# Patient Record
Sex: FEMALE | Race: ASIAN | NOT HISPANIC OR LATINO | Employment: OTHER | URBAN - METROPOLITAN AREA
[De-identification: names, ages, dates, MRNs, and addresses within clinical notes are randomized per-mention and may not be internally consistent; named-entity substitution may affect disease eponyms.]

---

## 2017-04-13 ENCOUNTER — HOSPITAL ENCOUNTER (OUTPATIENT)
Dept: RADIOLOGY | Facility: MEDICAL CENTER | Age: 57
End: 2017-04-13
Attending: OBSTETRICS & GYNECOLOGY
Payer: COMMERCIAL

## 2017-04-13 DIAGNOSIS — Z12.31 VISIT FOR SCREENING MAMMOGRAM: ICD-10-CM

## 2017-04-13 PROCEDURE — G0202 SCR MAMMO BI INCL CAD: HCPCS

## 2019-01-10 ENCOUNTER — HOSPITAL ENCOUNTER (OUTPATIENT)
Dept: RADIOLOGY | Facility: MEDICAL CENTER | Age: 59
End: 2019-01-10
Attending: OBSTETRICS & GYNECOLOGY
Payer: COMMERCIAL

## 2019-01-10 DIAGNOSIS — Z12.31 VISIT FOR SCREENING MAMMOGRAM: ICD-10-CM

## 2019-01-10 PROCEDURE — 77063 BREAST TOMOSYNTHESIS BI: CPT

## 2019-05-23 ENCOUNTER — APPOINTMENT (RX ONLY)
Dept: URBAN - METROPOLITAN AREA CLINIC 35 | Facility: CLINIC | Age: 59
Setting detail: DERMATOLOGY
End: 2019-05-23

## 2019-05-23 DIAGNOSIS — Z71.89 OTHER SPECIFIED COUNSELING: ICD-10-CM

## 2019-05-23 DIAGNOSIS — L81.4 OTHER MELANIN HYPERPIGMENTATION: ICD-10-CM

## 2019-05-23 DIAGNOSIS — L44.8 OTHER SPECIFIED PAPULOSQUAMOUS DISORDERS: ICD-10-CM

## 2019-05-23 DIAGNOSIS — L82.1 OTHER SEBORRHEIC KERATOSIS: ICD-10-CM

## 2019-05-23 DIAGNOSIS — D22 MELANOCYTIC NEVI: ICD-10-CM

## 2019-05-23 PROBLEM — D22.5 MELANOCYTIC NEVI OF TRUNK: Status: ACTIVE | Noted: 2019-05-23

## 2019-05-23 PROCEDURE — 99213 OFFICE O/P EST LOW 20 MIN: CPT

## 2019-05-23 PROCEDURE — ? COUNSELING

## 2019-05-23 ASSESSMENT — LOCATION ZONE DERM
LOCATION ZONE: FACE
LOCATION ZONE: TRUNK

## 2019-05-23 ASSESSMENT — LOCATION SIMPLE DESCRIPTION DERM
LOCATION SIMPLE: RIGHT EYEBROW
LOCATION SIMPLE: RIGHT UPPER BACK

## 2019-05-23 ASSESSMENT — LOCATION DETAILED DESCRIPTION DERM
LOCATION DETAILED: RIGHT INFERIOR UPPER BACK
LOCATION DETAILED: RIGHT CENTRAL EYEBROW
LOCATION DETAILED: RIGHT MID-UPPER BACK
LOCATION DETAILED: RIGHT SUPERIOR UPPER BACK

## 2019-05-23 NOTE — PROCEDURE: COUNSELING
Detail Level: Generalized
Patient Specific Counseling (Will Not Stick From Patient To Patient): Will recheck in 4 weeks, if lesion is not improving, will plan on punch biopsy.
Detail Level: Simple

## 2020-02-01 ENCOUNTER — HOSPITAL ENCOUNTER (OUTPATIENT)
Dept: RADIOLOGY | Facility: MEDICAL CENTER | Age: 60
End: 2020-02-01
Attending: SPECIALIST
Payer: COMMERCIAL

## 2020-02-01 DIAGNOSIS — Z12.31 VISIT FOR SCREENING MAMMOGRAM: ICD-10-CM

## 2020-02-01 PROCEDURE — 77067 SCR MAMMO BI INCL CAD: CPT

## 2021-04-21 ENCOUNTER — IMMUNIZATION (OUTPATIENT)
Dept: FAMILY PLANNING/WOMEN'S HEALTH CLINIC | Facility: IMMUNIZATION CENTER | Age: 61
End: 2021-04-21
Payer: COMMERCIAL

## 2021-04-21 DIAGNOSIS — Z23 ENCOUNTER FOR VACCINATION: Primary | ICD-10-CM

## 2021-04-21 PROCEDURE — 91300 PFIZER SARS-COV-2 VACCINE: CPT

## 2021-04-21 PROCEDURE — 0001A PFIZER SARS-COV-2 VACCINE: CPT

## 2021-05-13 ENCOUNTER — IMMUNIZATION (OUTPATIENT)
Dept: FAMILY PLANNING/WOMEN'S HEALTH CLINIC | Facility: IMMUNIZATION CENTER | Age: 61
End: 2021-05-13
Payer: COMMERCIAL

## 2021-05-13 DIAGNOSIS — Z23 ENCOUNTER FOR VACCINATION: Primary | ICD-10-CM

## 2021-05-13 PROCEDURE — 91300 PFIZER SARS-COV-2 VACCINE: CPT | Performed by: NURSE PRACTITIONER

## 2021-05-13 PROCEDURE — 0002A PFIZER SARS-COV-2 VACCINE: CPT | Performed by: NURSE PRACTITIONER

## 2021-06-15 ENCOUNTER — HOSPITAL ENCOUNTER (OUTPATIENT)
Dept: RADIOLOGY | Facility: MEDICAL CENTER | Age: 61
End: 2021-06-15
Attending: FAMILY MEDICINE
Payer: COMMERCIAL

## 2021-06-15 DIAGNOSIS — Z12.31 VISIT FOR SCREENING MAMMOGRAM: ICD-10-CM

## 2021-06-15 PROCEDURE — 77063 BREAST TOMOSYNTHESIS BI: CPT

## 2021-12-16 ENCOUNTER — GYNECOLOGY VISIT (OUTPATIENT)
Dept: OBGYN | Facility: CLINIC | Age: 61
End: 2021-12-16
Payer: COMMERCIAL

## 2021-12-16 ENCOUNTER — HOSPITAL ENCOUNTER (OUTPATIENT)
Facility: MEDICAL CENTER | Age: 61
End: 2021-12-16
Attending: OBSTETRICS & GYNECOLOGY
Payer: COMMERCIAL

## 2021-12-16 VITALS
DIASTOLIC BLOOD PRESSURE: 78 MMHG | WEIGHT: 126 LBS | SYSTOLIC BLOOD PRESSURE: 122 MMHG | BODY MASS INDEX: 20.99 KG/M2 | HEIGHT: 65 IN

## 2021-12-16 DIAGNOSIS — R30.0 DYSURIA: ICD-10-CM

## 2021-12-16 DIAGNOSIS — R31.9 HEMATURIA OF UNDIAGNOSED CAUSE: ICD-10-CM

## 2021-12-16 LAB
APPEARANCE UR: CLEAR
BILIRUB UR STRIP-MCNC: NORMAL MG/DL
COLOR UR AUTO: YELLOW
GLUCOSE UR STRIP.AUTO-MCNC: NORMAL MG/DL
KETONES UR STRIP.AUTO-MCNC: NORMAL MG/DL
LEUKOCYTE ESTERASE UR QL STRIP.AUTO: NORMAL
NITRITE UR QL STRIP.AUTO: NORMAL
PH UR STRIP.AUTO: 5.5 [PH] (ref 5–8)
PROT UR QL STRIP: NORMAL MG/DL
RBC UR QL AUTO: NORMAL
SP GR UR STRIP.AUTO: 1.03
UROBILINOGEN UR STRIP-MCNC: NORMAL MG/DL

## 2021-12-16 PROCEDURE — 81002 URINALYSIS NONAUTO W/O SCOPE: CPT | Performed by: OBSTETRICS & GYNECOLOGY

## 2021-12-16 PROCEDURE — 99212 OFFICE O/P EST SF 10 MIN: CPT | Performed by: OBSTETRICS & GYNECOLOGY

## 2021-12-16 PROCEDURE — 87186 SC STD MICRODIL/AGAR DIL: CPT

## 2021-12-16 PROCEDURE — 87086 URINE CULTURE/COLONY COUNT: CPT

## 2021-12-16 PROCEDURE — 87077 CULTURE AEROBIC IDENTIFY: CPT

## 2021-12-16 RX ORDER — SULFAMETHOXAZOLE AND TRIMETHOPRIM 800; 160 MG/1; MG/1
1 TABLET ORAL 2 TIMES DAILY
Qty: 6 TABLET | Refills: 0 | Status: SHIPPED | OUTPATIENT
Start: 2021-12-16 | End: 2021-12-19

## 2021-12-16 NOTE — PROGRESS NOTES
Chief Complaint   Patient presents with   • Gynecologic Exam     UTI       History of present illness: 61 y.o.  presents with frequency and dysuria with blood in urine. She states 2 nights ago she had frequency and pressure most of the night and saw flecks of blood in her urine in the toilet. She feels better now but wsa worried about getting sick over holidays.   She is an established patient of mine who had an annual this year at the [facility] office with me.     Review of systems:  Pertinent positives documented in HPI and all other systems reviewed & are negative    Past OB History:   OB History    Para Term  AB Living   1 1 1 0 0 1   SAB IAB Ectopic Molar Multiple Live Births   0 0 0 0 0 0       Past Gynecological History  No LMP recorded.  BC or HRT: none  Postmenopausal: yes Postmenopausal bleeding: no  All PMH, PSH, allergies, social history and FH reviewed and updated today:  Past Medical History:   Diagnosis Date   • Routine health maintenance 2013    BMI Mammogram 10/2012 Colonoscopy -- @ 51 to repeat in 5 years PAP smear  always nrmal Skin lesions to see the dermatologist Immunizations Influenza never Tetanus does not recall           Past Surgical History:   Procedure Laterality Date   • TUBAL COAGULATION LAPAROSCOPIC BILATERAL         Allergies: No Known Allergies    Social History     Socioeconomic History   • Marital status:      Spouse name: Not on file   • Number of children: Not on file   • Years of education: Not on file   • Highest education level: Not on file   Occupational History   • Not on file   Tobacco Use   • Smoking status: Never Smoker   • Smokeless tobacco: Never Used   Vaping Use   • Vaping Use: Never used   Substance and Sexual Activity   • Alcohol use: Yes     Comment: wine with dinner   • Drug use: Never   • Sexual activity: Yes     Partners: Male     Birth control/protection: Female Sterilization     Comment: works with post service   Other Topics  "Concern   • Not on file   Social History Narrative   • Not on file     Social Determinants of Health     Financial Resource Strain:    • Difficulty of Paying Living Expenses: Not on file   Food Insecurity:    • Worried About Running Out of Food in the Last Year: Not on file   • Ran Out of Food in the Last Year: Not on file   Transportation Needs:    • Lack of Transportation (Medical): Not on file   • Lack of Transportation (Non-Medical): Not on file   Physical Activity:    • Days of Exercise per Week: Not on file   • Minutes of Exercise per Session: Not on file   Stress:    • Feeling of Stress : Not on file   Social Connections:    • Frequency of Communication with Friends and Family: Not on file   • Frequency of Social Gatherings with Friends and Family: Not on file   • Attends [employer] Services: Not on file   • Active Member of Clubs or Organizations: Not on file   • Attends Club or Organization Meetings: Not on file   • Marital Status: Not on file   Intimate Partner Violence:    • Fear of Current or Ex-Partner: Not on file   • Emotionally Abused: Not on file   • Physically Abused: Not on file   • Sexually Abused: Not on file   Housing Stability:    • Unable to Pay for Housing in the Last Year: Not on file   • Number of Places Lived in the Last Year: Not on file   • Unstable Housing in the Last Year: Not on file       Family History   Problem Relation Age of Onset   • Diabetes Mother    • Hypertension Mother    • Stroke Mother    • Diabetes Brother    • Hypertension Brother    • Cancer Neg Hx        Physical exam:  /78 (BP Location: Left arm, Patient Position: Sitting, BP Cuff Size: Adult)   Ht 1.651 m (5' 5\")   Wt 57.2 kg (126 lb)     General:appears stated age, is in no apparent distress, is well developed and well nourished  Head: normocephalic    CV : regular rate and rhythm  Psychiatric: Patient shows appropriate affect, is alert and oriented x3, intact judgment and insight.      Assessment  61 y.o. "  here for UTI rule out  1. Dysuria  POCT Urinalysis    URINE CULTURE(NEW)   2. Hematuria of undiagnosed cause         Plan  - UA dip done- sent for culture  - will send in RX for ABX but will only fill if symptoms return or when culture results available if indicated. All ?S answered and she is comfortable with this plan.

## 2021-12-19 LAB
BACTERIA UR CULT: ABNORMAL
BACTERIA UR CULT: ABNORMAL
SIGNIFICANT IND 70042: ABNORMAL
SITE SITE: ABNORMAL
SOURCE SOURCE: ABNORMAL

## 2021-12-21 ENCOUNTER — TELEPHONE (OUTPATIENT)
Dept: OBGYN | Facility: CLINIC | Age: 61
End: 2021-12-21

## 2021-12-21 NOTE — TELEPHONE ENCOUNTER
L/M for pt with positive urine culture results and to start taking bactrim rx that was sent to the pharmacy.[email or web address]

## 2022-06-21 ENCOUNTER — HOSPITAL ENCOUNTER (OUTPATIENT)
Dept: RADIOLOGY | Facility: MEDICAL CENTER | Age: 62
End: 2022-06-21
Attending: OBSTETRICS & GYNECOLOGY
Payer: COMMERCIAL

## 2022-06-21 DIAGNOSIS — Z12.31 VISIT FOR SCREENING MAMMOGRAM: ICD-10-CM

## 2022-06-21 PROCEDURE — 77063 BREAST TOMOSYNTHESIS BI: CPT

## 2023-05-05 ENCOUNTER — HOSPITAL ENCOUNTER (OUTPATIENT)
Dept: LAB | Facility: MEDICAL CENTER | Age: 63
End: 2023-05-05
Attending: NURSE PRACTITIONER
Payer: COMMERCIAL

## 2023-05-05 LAB
ALBUMIN SERPL BCP-MCNC: 4.5 G/DL (ref 3.2–4.9)
ALBUMIN/GLOB SERPL: 1.6 G/DL
ALP SERPL-CCNC: 70 U/L (ref 30–99)
ALT SERPL-CCNC: 17 U/L (ref 2–50)
ANION GAP SERPL CALC-SCNC: 13 MMOL/L (ref 7–16)
AST SERPL-CCNC: 23 U/L (ref 12–45)
BILIRUB SERPL-MCNC: 0.8 MG/DL (ref 0.1–1.5)
BUN SERPL-MCNC: 13 MG/DL (ref 8–22)
CALCIUM ALBUM COR SERPL-MCNC: 9.2 MG/DL (ref 8.5–10.5)
CALCIUM SERPL-MCNC: 9.6 MG/DL (ref 8.5–10.5)
CHLORIDE SERPL-SCNC: 103 MMOL/L (ref 96–112)
CO2 SERPL-SCNC: 24 MMOL/L (ref 20–33)
CREAT SERPL-MCNC: 0.75 MG/DL (ref 0.5–1.4)
GFR SERPLBLD CREATININE-BSD FMLA CKD-EPI: 90 ML/MIN/1.73 M 2
GLOBULIN SER CALC-MCNC: 2.9 G/DL (ref 1.9–3.5)
GLUCOSE SERPL-MCNC: 95 MG/DL (ref 65–99)
POTASSIUM SERPL-SCNC: 3.9 MMOL/L (ref 3.6–5.5)
PROT SERPL-MCNC: 7.4 G/DL (ref 6–8.2)
SODIUM SERPL-SCNC: 140 MMOL/L (ref 135–145)
T4 FREE SERPL-MCNC: 1.16 NG/DL (ref 0.93–1.7)
TSH SERPL DL<=0.005 MIU/L-ACNC: 1.27 UIU/ML (ref 0.38–5.33)

## 2023-05-05 PROCEDURE — 84439 ASSAY OF FREE THYROXINE: CPT

## 2023-05-05 PROCEDURE — 36415 COLL VENOUS BLD VENIPUNCTURE: CPT

## 2023-05-05 PROCEDURE — 80053 COMPREHEN METABOLIC PANEL: CPT

## 2023-05-05 PROCEDURE — 84443 ASSAY THYROID STIM HORMONE: CPT

## 2023-05-11 ENCOUNTER — HOSPITAL ENCOUNTER (OUTPATIENT)
Dept: RADIOLOGY | Facility: MEDICAL CENTER | Age: 63
End: 2023-05-11
Attending: NURSE PRACTITIONER
Payer: COMMERCIAL

## 2023-05-11 DIAGNOSIS — R10.31 RIGHT LOWER QUADRANT ABDOMINAL PAIN: ICD-10-CM

## 2023-05-11 DIAGNOSIS — R19.8: ICD-10-CM

## 2023-05-11 PROCEDURE — 700117 HCHG RX CONTRAST REV CODE 255

## 2023-05-11 PROCEDURE — 74177 CT ABD & PELVIS W/CONTRAST: CPT

## 2023-05-11 RX ADMIN — IOHEXOL 25 ML: 240 INJECTION, SOLUTION INTRATHECAL; INTRAVASCULAR; INTRAVENOUS; ORAL at 15:00

## 2023-05-11 RX ADMIN — IOHEXOL 100 ML: 350 INJECTION, SOLUTION INTRAVENOUS at 15:00

## 2023-07-12 ENCOUNTER — GYNECOLOGY VISIT (OUTPATIENT)
Dept: OBGYN | Facility: CLINIC | Age: 63
End: 2023-07-12
Payer: COMMERCIAL

## 2023-07-12 VITALS — WEIGHT: 128 LBS | BODY MASS INDEX: 21.3 KG/M2

## 2023-07-12 DIAGNOSIS — R19.8 ABDOMINAL FULLNESS IN RIGHT LOWER QUADRANT: Primary | ICD-10-CM

## 2023-07-12 LAB
APPEARANCE UR: CLEAR
BILIRUB UR STRIP-MCNC: NORMAL MG/DL
COLOR UR AUTO: YELLOW
GLUCOSE UR STRIP.AUTO-MCNC: NORMAL MG/DL
KETONES UR STRIP.AUTO-MCNC: NORMAL MG/DL
LEUKOCYTE ESTERASE UR QL STRIP.AUTO: NORMAL
NITRITE UR QL STRIP.AUTO: NORMAL
PH UR STRIP.AUTO: 6 [PH] (ref 5–8)
PROT UR QL STRIP: NORMAL MG/DL
RBC UR QL AUTO: NORMAL
SP GR UR STRIP.AUTO: 1.03
UROBILINOGEN UR STRIP-MCNC: NORMAL MG/DL

## 2023-07-12 PROCEDURE — 99212 OFFICE O/P EST SF 10 MIN: CPT | Performed by: OBSTETRICS & GYNECOLOGY

## 2023-07-12 PROCEDURE — 81002 URINALYSIS NONAUTO W/O SCOPE: CPT | Performed by: OBSTETRICS & GYNECOLOGY

## 2023-07-12 NOTE — PROGRESS NOTES
History of present illness: 63 y.o.  No LMP recorded (approximate). Patient is postmenopausal. presents with complaints of RIGHT sided fullness since the beginning of the year. She was having some problems with constipation then and was seen by GI and had a work up and no pathology discovered. She had a CT abdomen pelvis that was normal. She reports that her bowel movements are back to normal but she still feels just this slight fullness to the right pelvis. It is not painful per say.   She has no vaginal bleeding or discharge. Se is postmenopausal.     Review of systems:  Pertinent positives documented in HPI and all other systems reviewed & are negative    Past OB History:   OB History    Para Term  AB Living   1 1 1 0 0 1   SAB IAB Ectopic Molar Multiple Live Births   0 0 0 0 0 0       Past Gynecological History  No LMP recorded (approximate). Patient is postmenopausal.  Postmenopausal?: yes Postmenopausal bleeding?: no  Pap: last , no hx of abnormal  Symptoms of overactive bladder: no  Symptoms of stress incontinence: no  Symptoms of bowel incontinence: no  Feeling of vaginal bulge: no      All PMH, PSH, allergies, social history and FH reviewed and updated today:  Past Medical History:   Diagnosis Date    Routine health maintenance 2013    BMI Mammogram 10/2012 Colonoscopy -- @ 51 to repeat in 5 years PAP smear  always nrmal Skin lesions to see the dermatologist Immunizations Influenza never Tetanus does not recall           Past Surgical History:   Procedure Laterality Date    TUBAL COAGULATION LAPAROSCOPIC BILATERAL         Allergies: No Known Allergies    Social History     Socioeconomic History    Marital status:      Spouse name: Not on file    Number of children: Not on file    Years of education: Not on file    Highest education level: Not on file   Occupational History    Not on file   Tobacco Use    Smoking status: Never    Smokeless tobacco: Never   Vaping  Use    Vaping Use: Never used   Substance and Sexual Activity    Alcohol use: Yes     Comment: wine with dinner    Drug use: Never    Sexual activity: Yes     Partners: Male     Birth control/protection: Female Sterilization     Comment: works with post service   Other Topics Concern    Not on file   Social History Narrative    Not on file     Social Determinants of Health     Financial Resource Strain: Not on file   Food Insecurity: Not on file   Transportation Needs: Not on file   Physical Activity: Not on file   Stress: Not on file   Social Connections: Not on file   Intimate Partner Violence: Not on file   Housing Stability: Not on file       Family History   Problem Relation Age of Onset    Diabetes Mother     Hypertension Mother     Stroke Mother     Diabetes Brother     Hypertension Brother     Cancer Neg Hx        Physical exam:  Wt 128 lb     General:is in no apparent distress  Head: normocephalic, non-tender  Abdomen: Bowel sounds positive, nondistended, soft, nontender x4, no rebound or guarding. No organomegaly. No masses. Reports fullness in RLQ.   Female GYN: normal female external genitalia without lesions, no vaginal discharge, vulva pink without erythema or friability. Cervix parous without lesions. Right adnexal fullness with TTP. Left adnexa NT, no masses palapted  Psychiatric: Patient shows appropriate affect, is alert and oriented x3, intact judgment and insight.      Assessment  63 y.o.  here with c/o  1. Abdominal fullness in right lower quadrant            Plan  - Pelvic US ordered secondary to exam findings of fullness and TTP right adnexa  - Follow up when results available

## 2023-07-12 NOTE — PROGRESS NOTES
Pt here as New Pt for Gyn exam  Confirmed ph#, drug allergies, medications, and pharmacy on file.  Last Pap:2021  Abnormal Pap:No Hx of abnormal pap smear per pt  Last Mammo:6/21/2022  BCM:none  LMP:postmenopausal  Pt states would like to establish care.  Pt states would like to have a well woman exam.

## 2023-08-09 ENCOUNTER — HOSPITAL ENCOUNTER (OUTPATIENT)
Dept: RADIOLOGY | Facility: MEDICAL CENTER | Age: 63
End: 2023-08-09
Attending: OBSTETRICS & GYNECOLOGY
Payer: COMMERCIAL

## 2023-08-09 DIAGNOSIS — R19.8 ABDOMINAL FULLNESS IN RIGHT LOWER QUADRANT: ICD-10-CM

## 2023-08-09 PROCEDURE — 76830 TRANSVAGINAL US NON-OB: CPT

## 2023-09-27 ENCOUNTER — GYNECOLOGY VISIT (OUTPATIENT)
Dept: OBGYN | Facility: CLINIC | Age: 63
End: 2023-09-27
Payer: COMMERCIAL

## 2023-09-27 ENCOUNTER — HOSPITAL ENCOUNTER (OUTPATIENT)
Facility: MEDICAL CENTER | Age: 63
End: 2023-09-27
Attending: OBSTETRICS & GYNECOLOGY
Payer: COMMERCIAL

## 2023-09-27 DIAGNOSIS — R93.5 ABNORMAL ENDOMETRIAL ULTRASOUND: ICD-10-CM

## 2023-09-27 LAB — PATHOLOGY CONSULT NOTE: NORMAL

## 2023-09-27 PROCEDURE — 88305 TISSUE EXAM BY PATHOLOGIST: CPT

## 2023-09-27 PROCEDURE — 58100 BIOPSY OF UTERUS LINING: CPT | Performed by: OBSTETRICS & GYNECOLOGY

## 2023-09-27 NOTE — PROGRESS NOTES
GYN Visit:    CC: endometrial biopsy    HPI:  63 y.o.  here today for planned endometrial biopsy. She is postmenopausal and has not had any vaginal bleeding. She was seen earlier this year for right pelvic fullness. She was seen by PCP and GI. She had CT scan that was benign. Her pelvic US also revealed no adnexal findings and uterus was normal with ES 0.49 cm but with a focal hypoechoic debris and internal echoes in the fundal endometrial canal of unclear etiology. We discussed that the suspicion for abnormality is low but because we have identified this that evaluation with biopsy is recommended. She agrees and present for this today.     No concerns today     Past Medical History:   Diagnosis Date    Routine health maintenance 2013    BMI Mammogram 10/2012 Colonoscopy -- @ 51 to repeat in 5 years PAP smear  always nrmal Skin lesions to see the dermatologist Immunizations Influenza never Tetanus does not recall           ROS:  Gen: denies fevers, general concerns  : denies concerns    There were no vitals taken for this visit.  Gen: AAO, NAD  : NEFG, normal vagina and cervix    EMB:  Cervix cleansed with betadine x3.  Ant lip grasped with tenaculum.  Pipelle passed easily through cervix, uterus sounded to 6.5 cm.  3 passes were made with adequate collection of tissue.  With the initial pass and removal in the fundus there was clear thick mucous that was drawn into the pipelle. Specimen labeled and sent to pathology. All instruments removed with hemostasis noted.  Pt tolerated well.    A/P: 63 y.o.  s/p EMB for   1. Abnormal endometrial ultrasound  Consent for all Surgical, Special Diagnostic or Therapeutic Procedures    CANCELED: Consent for all Surgical, Special Diagnostic or Therapeutic Procedures    CANCELED: Consent for all Surgical, Special Diagnostic or Therapeutic Procedures         - will notify pt of pathology

## 2024-04-08 ENCOUNTER — GYNECOLOGY VISIT (OUTPATIENT)
Dept: OBGYN | Facility: CLINIC | Age: 64
End: 2024-04-08
Payer: COMMERCIAL

## 2024-04-08 ENCOUNTER — HOSPITAL ENCOUNTER (OUTPATIENT)
Facility: MEDICAL CENTER | Age: 64
End: 2024-04-08
Attending: OBSTETRICS & GYNECOLOGY
Payer: COMMERCIAL

## 2024-04-08 VITALS — WEIGHT: 125.4 LBS | BODY MASS INDEX: 20.87 KG/M2 | DIASTOLIC BLOOD PRESSURE: 66 MMHG | SYSTOLIC BLOOD PRESSURE: 127 MMHG

## 2024-04-08 DIAGNOSIS — N30.01 ACUTE CYSTITIS WITH HEMATURIA: ICD-10-CM

## 2024-04-08 DIAGNOSIS — N30.01 ACUTE CYSTITIS WITH HEMATURIA: Primary | ICD-10-CM

## 2024-04-08 LAB
APPEARANCE UR: NORMAL
BILIRUB UR STRIP-MCNC: NEGATIVE MG/DL
COLOR UR AUTO: YELLOW
GLUCOSE UR STRIP.AUTO-MCNC: NEGATIVE MG/DL
KETONES UR STRIP.AUTO-MCNC: NORMAL MG/DL
LEUKOCYTE ESTERASE UR QL STRIP.AUTO: NEGATIVE
NITRITE UR QL STRIP.AUTO: NEGATIVE
PH UR STRIP.AUTO: 6.5 [PH] (ref 5–8)
PROT UR QL STRIP: NEGATIVE MG/DL
RBC UR QL AUTO: NEGATIVE
SP GR UR STRIP.AUTO: 1.02
UROBILINOGEN UR STRIP-MCNC: 0.2 MG/DL

## 2024-04-08 PROCEDURE — 87186 SC STD MICRODIL/AGAR DIL: CPT

## 2024-04-08 PROCEDURE — 81002 URINALYSIS NONAUTO W/O SCOPE: CPT | Performed by: OBSTETRICS & GYNECOLOGY

## 2024-04-08 PROCEDURE — 87086 URINE CULTURE/COLONY COUNT: CPT

## 2024-04-08 PROCEDURE — 3078F DIAST BP <80 MM HG: CPT | Performed by: OBSTETRICS & GYNECOLOGY

## 2024-04-08 PROCEDURE — 87077 CULTURE AEROBIC IDENTIFY: CPT

## 2024-04-08 PROCEDURE — 3074F SYST BP LT 130 MM HG: CPT | Performed by: OBSTETRICS & GYNECOLOGY

## 2024-04-08 PROCEDURE — 99213 OFFICE O/P EST LOW 20 MIN: CPT | Performed by: OBSTETRICS & GYNECOLOGY

## 2024-04-08 RX ORDER — SULFAMETHOXAZOLE AND TRIMETHOPRIM 800; 160 MG/1; MG/1
1 TABLET ORAL 2 TIMES DAILY
Qty: 6 TABLET | Refills: 0 | Status: SHIPPED | OUTPATIENT
Start: 2024-04-08 | End: 2024-04-11

## 2024-04-08 RX ORDER — CYCLOSPORINE 0.5 MG/ML
1 EMULSION OPHTHALMIC 2 TIMES DAILY
COMMUNITY

## 2024-04-08 NOTE — PROGRESS NOTES
GYN PROBLEM VISIT    CC:  UTI       HPI: Patient is a 63 y.o.  who complains of possible UTI. She states it started Saturday with a lower abdominal cramp and then she noticed red blood in the toilet/urine when she peed. She doesn't think it was vaginal but states it was definitely initially bloody. Throughout the day it faded to pink but she experienced continued urge to urinate throughout the day which is mildly improved.         ROS:   General: denies fever / chills  HEENT: denies sore throat:  CV: denies chest pain:  Repiratory: denies shortness of breath  GI: denies abdominal pain  : denies dysuria:    PFSH:  I personally reviewed the past medical and surgical histories.     Social History     Tobacco Use    Smoking status: Never    Smokeless tobacco: Never   Vaping Use    Vaping Use: Never used   Substance Use Topics    Alcohol use: Yes     Alcohol/week: 0.6 oz     Types: 1 Glasses of wine per week     Comment: wine with dinner    Drug use: Never       Social History     Substance and Sexual Activity   Sexual Activity Yes    Partners: Male    Birth control/protection: Female Sterilization    Comment: works with post service        ALLERGIES / REACTIONS:  No Known Allergies                        PHYSICAL EXAMINATION:  Vital Signs:   Vitals:    24 1120   BP: 127/66   BP Location: Right arm   Patient Position: Sitting   BP Cuff Size: Adult   Weight: 125 lb 6.4 oz     Body mass index is 20.87 kg/m².    Gen: appears well, NAD  Respiratory: normal effort  Abdomen: Soft,  Mild suprapubic tenderness  Pelvic Exam:    Vulva: normal.    Urethra: normal.   Vagina:  moderate atrophy noted. No blood or old blood in vault.    Cervix: normal.     ASSESSMENT AND PLAN:  63 y.o.    1. Acute cystitis with hematuria   - UA shows +nit and moderate blood consistent with her description and consistent with UTI   - will send for C&S   - Rx bactrim   - no evidence on exam that this was vaginal bleeding but  counseled to return if she continues to see blood. In this case it was likely hematuria given findings on UA today    - sulfamethoxazole-trimethoprim (BACTRIM DS) 800-160 MG tablet; Take 1 Tablet by mouth 2 times a day for 3 days.  Dispense: 6 Tablet; Refill: 0  - URINE CULTURE(NEW); Future      Bere Nava D.O.

## 2024-04-08 NOTE — PROGRESS NOTES
Glad she is feeling better.  Continue to hold valsartan at this time.  Continue to monitor blood pressures.    Can evaluate for possible restart of a reduced dose of valsartan in the future   Patient here for annual exam  Last pap done/result: maybe a year ago  LMP: post menopause   BCM:  Last mammogram, if applicable:  Phone number: [phone]   Pharmacy verified

## 2024-04-09 LAB
AMBIGUOUS DTTM AMBI4: NORMAL
SIGNIFICANT IND 70042: NORMAL
SITE SITE: NORMAL
SOURCE SOURCE: NORMAL

## 2024-04-27 ENCOUNTER — OFFICE VISIT (OUTPATIENT)
Dept: URGENT CARE | Facility: PHYSICIAN GROUP | Age: 64
End: 2024-04-27
Payer: COMMERCIAL

## 2024-04-27 VITALS
DIASTOLIC BLOOD PRESSURE: 72 MMHG | TEMPERATURE: 98.8 F | WEIGHT: 121.36 LBS | HEART RATE: 60 BPM | OXYGEN SATURATION: 99 % | SYSTOLIC BLOOD PRESSURE: 100 MMHG | HEIGHT: 64 IN | RESPIRATION RATE: 16 BRPM | BODY MASS INDEX: 20.72 KG/M2

## 2024-04-27 DIAGNOSIS — R53.83 OTHER FATIGUE: ICD-10-CM

## 2024-04-27 DIAGNOSIS — R50.9 FEVER AND CHILLS: ICD-10-CM

## 2024-04-27 DIAGNOSIS — M79.10 MYALGIA: ICD-10-CM

## 2024-04-27 DIAGNOSIS — R30.0 DYSURIA: ICD-10-CM

## 2024-04-27 DIAGNOSIS — B34.9 NONSPECIFIC SYNDROME SUGGESTIVE OF VIRAL ILLNESS: ICD-10-CM

## 2024-04-27 DIAGNOSIS — G44.89 OTHER HEADACHE SYNDROME: ICD-10-CM

## 2024-04-27 LAB
APPEARANCE UR: CLEAR
BILIRUB UR STRIP-MCNC: NEGATIVE MG/DL
COLOR UR AUTO: YELLOW
FLUAV RNA SPEC QL NAA+PROBE: NEGATIVE
FLUBV RNA SPEC QL NAA+PROBE: NEGATIVE
GLUCOSE UR STRIP.AUTO-MCNC: NEGATIVE MG/DL
KETONES UR STRIP.AUTO-MCNC: NORMAL MG/DL
LEUKOCYTE ESTERASE UR QL STRIP.AUTO: NEGATIVE
NITRITE UR QL STRIP.AUTO: NEGATIVE
PH UR STRIP.AUTO: 5.5 [PH] (ref 5–8)
PROT UR QL STRIP: NEGATIVE MG/DL
RBC UR QL AUTO: NORMAL
RSV RNA SPEC QL NAA+PROBE: NEGATIVE
SARS-COV-2 RNA RESP QL NAA+PROBE: NEGATIVE
SP GR UR STRIP.AUTO: 1.02
UROBILINOGEN UR STRIP-MCNC: NORMAL MG/DL

## 2024-04-27 PROCEDURE — 3074F SYST BP LT 130 MM HG: CPT | Performed by: NURSE PRACTITIONER

## 2024-04-27 PROCEDURE — 99214 OFFICE O/P EST MOD 30 MIN: CPT | Mod: 25 | Performed by: NURSE PRACTITIONER

## 2024-04-27 PROCEDURE — 81002 URINALYSIS NONAUTO W/O SCOPE: CPT | Performed by: NURSE PRACTITIONER

## 2024-04-27 PROCEDURE — 0241U POCT CEPHEID COV-2, FLU A/B, RSV - PCR: CPT | Performed by: NURSE PRACTITIONER

## 2024-04-27 PROCEDURE — 3078F DIAST BP <80 MM HG: CPT | Performed by: NURSE PRACTITIONER

## 2024-04-27 RX ORDER — KETOROLAC TROMETHAMINE 30 MG/ML
15 INJECTION, SOLUTION INTRAMUSCULAR; INTRAVENOUS ONCE
Status: COMPLETED | OUTPATIENT
Start: 2024-04-27 | End: 2024-04-27

## 2024-04-27 RX ADMIN — KETOROLAC TROMETHAMINE 15 MG: 30 INJECTION, SOLUTION INTRAMUSCULAR; INTRAVENOUS at 15:15

## 2024-04-27 NOTE — PROGRESS NOTES
"Subjective:   Rivka Kumar is a 63 y.o. female who presents for Fever (Fever, lethargic, loss of weight, loss of appetite, difficult urinating, headache, chills   X 3 days did have a UTI on 04/08/24, did get the shingles vaccination a week ago as well )       HPI  Patient is a pleasant 63 y.o. who presents for evaluation of 3-day history of fever with a Tmax of 103, fatigue, decreased appetite, severe headache, and chills.  Patient reports recently having UTI, which she took medication as prescribed.  Patient also reports updating her vaccination schedule.  Denies any known ill contacts or exposures.  Has taken ibuprofen intermittently with partial relief of symptoms, however her headache is continued.  Denies any vision change or dizziness.    ROS  All other systems are negative except as documented above within HPI.    MEDS:   Current Outpatient Medications:     cycloSPORINE (RESTASIS) 0.05 % ophthalmic emulsion, Administer 1 Drop into both eyes 2 times a day., Disp: , Rfl:     cyclobenzaprine (FLEXERIL) 5 MG tablet, Take 1 Tab by mouth 2 times a day as needed for Muscle Spasms., Disp: 40 Tab, Rfl: 0  ALLERGIES: No Known Allergies    Patient's PMH, SocHx, SurgHx, FamHx, Drug allergies and medications were reviewed.     Objective:   /72 (BP Location: Left arm, Patient Position: Sitting, BP Cuff Size: Adult)   Pulse 60   Temp 37.1 °C (98.8 °F) (Temporal)   Resp 16   Ht 1.626 m (5' 4\")   Wt 55 kg (121 lb 5.8 oz)   SpO2 99%   BMI 20.83 kg/m²     Physical Exam  Vitals and nursing note reviewed.   Constitutional:       General: She is awake.      Appearance: Normal appearance. She is well-developed and normal weight.      Comments: Appears tired   HENT:      Head: Normocephalic and atraumatic.      Right Ear: Tympanic membrane, ear canal and external ear normal.      Left Ear: Tympanic membrane, ear canal and external ear normal.      Nose: Nose normal.      Mouth/Throat:      Lips: Pink.      " Mouth: Mucous membranes are moist.      Pharynx: Oropharynx is clear. Uvula midline.   Eyes:      Extraocular Movements: Extraocular movements intact.      Conjunctiva/sclera: Conjunctivae normal.      Pupils: Pupils are equal, round, and reactive to light.   Neck:      Thyroid: No thyromegaly.      Trachea: Trachea normal.   Cardiovascular:      Rate and Rhythm: Normal rate and regular rhythm.      Pulses: Normal pulses.      Heart sounds: Normal heart sounds, S1 normal and S2 normal.   Pulmonary:      Effort: Pulmonary effort is normal. No respiratory distress.      Breath sounds: Normal breath sounds. No wheezing, rhonchi or rales.   Abdominal:      General: Bowel sounds are normal.      Palpations: Abdomen is soft.   Musculoskeletal:         General: Normal range of motion.      Cervical back: Full passive range of motion without pain, normal range of motion and neck supple.   Lymphadenopathy:      Cervical: No cervical adenopathy.   Skin:     General: Skin is warm and dry.      Capillary Refill: Capillary refill takes less than 2 seconds.   Neurological:      General: No focal deficit present.      Mental Status: She is alert and oriented to person, place, and time.      Gait: Gait is intact.   Psychiatric:         Attention and Perception: Attention and perception normal.         Mood and Affect: Mood normal.         Speech: Speech normal.         Behavior: Behavior normal. Behavior is cooperative.         Thought Content: Thought content normal.         Judgment: Judgment normal.         Assessment/Plan:   Assessment    There are no diagnoses linked to this encounter.    Vital signs stable at today's acute urgent care visit.  Begin medications as listed. Recommend ***    Advised the patient to follow-up with the primary care provider if symptoms persist.  Red flags discussed and indications to immediately call 911 or present to the ED. All questions were encouraged and answered to the patient's satisfaction  and understanding, and they agree to the plan of care.     This is an acute problem with uncertain prognosis, medication management and instructions as well as management options were provided.  I personally reviewed prior external notes and test results pertinent to today and independently reviewed and interpreted all diagnostics, to include POCT testing. Time spent evaluating this patient includes preparing for visit, counseling/education, exam, evaluation, obtaining history, and ordering lab/test/procedures.      Please note that this dictation was created using voice recognition software. I have made a reasonable attempt to correct obvious errors, but I expect that there are errors of grammar and possibly content that I did not discover before finalizing the note.

## 2024-06-25 SDOH — ECONOMIC STABILITY: HOUSING INSECURITY
IN THE LAST 12 MONTHS, WAS THERE A TIME WHEN YOU DID NOT HAVE A STEADY PLACE TO SLEEP OR SLEPT IN A SHELTER (INCLUDING NOW)?: NO

## 2024-06-25 SDOH — ECONOMIC STABILITY: HOUSING INSECURITY: IN THE LAST 12 MONTHS, HOW MANY PLACES HAVE YOU LIVED?: 1

## 2024-06-25 SDOH — ECONOMIC STABILITY: TRANSPORTATION INSECURITY
IN THE PAST 12 MONTHS, HAS THE LACK OF TRANSPORTATION KEPT YOU FROM MEDICAL APPOINTMENTS OR FROM GETTING MEDICATIONS?: NO

## 2024-06-25 SDOH — HEALTH STABILITY: PHYSICAL HEALTH: ON AVERAGE, HOW MANY MINUTES DO YOU ENGAGE IN EXERCISE AT THIS LEVEL?: 60 MIN

## 2024-06-25 SDOH — ECONOMIC STABILITY: INCOME INSECURITY: IN THE LAST 12 MONTHS, WAS THERE A TIME WHEN YOU WERE NOT ABLE TO PAY THE MORTGAGE OR RENT ON TIME?: NO

## 2024-06-25 SDOH — HEALTH STABILITY: MENTAL HEALTH
STRESS IS WHEN SOMEONE FEELS TENSE, NERVOUS, ANXIOUS, OR CAN'T SLEEP AT NIGHT BECAUSE THEIR MIND IS TROUBLED. HOW STRESSED ARE YOU?: TO SOME EXTENT

## 2024-06-25 SDOH — HEALTH STABILITY: PHYSICAL HEALTH: ON AVERAGE, HOW MANY DAYS PER WEEK DO YOU ENGAGE IN MODERATE TO STRENUOUS EXERCISE (LIKE A BRISK WALK)?: 4 DAYS

## 2024-06-25 SDOH — ECONOMIC STABILITY: TRANSPORTATION INSECURITY
IN THE PAST 12 MONTHS, HAS LACK OF RELIABLE TRANSPORTATION KEPT YOU FROM MEDICAL APPOINTMENTS, MEETINGS, WORK OR FROM GETTING THINGS NEEDED FOR DAILY LIVING?: NO

## 2024-06-25 SDOH — ECONOMIC STABILITY: FOOD INSECURITY: WITHIN THE PAST 12 MONTHS, YOU WORRIED THAT YOUR FOOD WOULD RUN OUT BEFORE YOU GOT MONEY TO BUY MORE.: NEVER TRUE

## 2024-06-25 SDOH — ECONOMIC STABILITY: FOOD INSECURITY: WITHIN THE PAST 12 MONTHS, THE FOOD YOU BOUGHT JUST DIDN'T LAST AND YOU DIDN'T HAVE MONEY TO GET MORE.: NEVER TRUE

## 2024-06-25 SDOH — ECONOMIC STABILITY: INCOME INSECURITY: HOW HARD IS IT FOR YOU TO PAY FOR THE VERY BASICS LIKE FOOD, HOUSING, MEDICAL CARE, AND HEATING?: NOT HARD AT ALL

## 2024-06-25 SDOH — ECONOMIC STABILITY: TRANSPORTATION INSECURITY
IN THE PAST 12 MONTHS, HAS LACK OF TRANSPORTATION KEPT YOU FROM MEETINGS, WORK, OR FROM GETTING THINGS NEEDED FOR DAILY LIVING?: NO

## 2024-06-25 ASSESSMENT — SOCIAL DETERMINANTS OF HEALTH (SDOH)
IN A TYPICAL WEEK, HOW MANY TIMES DO YOU TALK ON THE PHONE WITH FAMILY, FRIENDS, OR NEIGHBORS?: MORE THAN THREE TIMES A WEEK
IN THE PAST 12 MONTHS, HAS THE ELECTRIC, GAS, OIL, OR WATER COMPANY THREATENED TO SHUT OFF SERVICE IN YOUR HOME?: NO
HOW OFTEN DO YOU HAVE A DRINK CONTAINING ALCOHOL: 4 OR MORE TIMES A WEEK
IN A TYPICAL WEEK, HOW MANY TIMES DO YOU TALK ON THE PHONE WITH FAMILY, FRIENDS, OR NEIGHBORS?: MORE THAN THREE TIMES A WEEK
HOW OFTEN DO YOU ATTEND CHURCH OR RELIGIOUS SERVICES?: NEVER
WITHIN THE PAST 12 MONTHS, YOU WORRIED THAT YOUR FOOD WOULD RUN OUT BEFORE YOU GOT THE MONEY TO BUY MORE: NEVER TRUE
HOW OFTEN DO YOU HAVE SIX OR MORE DRINKS ON ONE OCCASION: NEVER
HOW HARD IS IT FOR YOU TO PAY FOR THE VERY BASICS LIKE FOOD, HOUSING, MEDICAL CARE, AND HEATING?: NOT HARD AT ALL
HOW MANY DRINKS CONTAINING ALCOHOL DO YOU HAVE ON A TYPICAL DAY WHEN YOU ARE DRINKING: 1 OR 2
DO YOU BELONG TO ANY CLUBS OR ORGANIZATIONS SUCH AS CHURCH GROUPS UNIONS, FRATERNAL OR ATHLETIC GROUPS, OR SCHOOL GROUPS?: YES
HOW OFTEN DO YOU ATTENT MEETINGS OF THE CLUB OR ORGANIZATION YOU BELONG TO?: MORE THAN 4 TIMES PER YEAR
DO YOU BELONG TO ANY CLUBS OR ORGANIZATIONS SUCH AS CHURCH GROUPS UNIONS, FRATERNAL OR ATHLETIC GROUPS, OR SCHOOL GROUPS?: YES
HOW OFTEN DO YOU ATTEND CHURCH OR RELIGIOUS SERVICES?: NEVER
HOW OFTEN DO YOU GET TOGETHER WITH FRIENDS OR RELATIVES?: MORE THAN THREE TIMES A WEEK
HOW OFTEN DO YOU ATTENT MEETINGS OF THE CLUB OR ORGANIZATION YOU BELONG TO?: MORE THAN 4 TIMES PER YEAR
HOW OFTEN DO YOU GET TOGETHER WITH FRIENDS OR RELATIVES?: MORE THAN THREE TIMES A WEEK

## 2024-06-25 ASSESSMENT — LIFESTYLE VARIABLES
HOW OFTEN DO YOU HAVE A DRINK CONTAINING ALCOHOL: 4 OR MORE TIMES A WEEK
HOW MANY STANDARD DRINKS CONTAINING ALCOHOL DO YOU HAVE ON A TYPICAL DAY: 1 OR 2
SKIP TO QUESTIONS 9-10: 1
HOW OFTEN DO YOU HAVE SIX OR MORE DRINKS ON ONE OCCASION: NEVER
AUDIT-C TOTAL SCORE: 4

## 2024-06-28 ENCOUNTER — OFFICE VISIT (OUTPATIENT)
Dept: MEDICAL GROUP | Facility: PHYSICIAN GROUP | Age: 64
End: 2024-06-28
Payer: COMMERCIAL

## 2024-06-28 VITALS
OXYGEN SATURATION: 98 % | SYSTOLIC BLOOD PRESSURE: 120 MMHG | TEMPERATURE: 98 F | HEIGHT: 64 IN | WEIGHT: 126 LBS | DIASTOLIC BLOOD PRESSURE: 70 MMHG | HEART RATE: 50 BPM | BODY MASS INDEX: 21.51 KG/M2

## 2024-06-28 DIAGNOSIS — Z00.00 WELLNESS EXAMINATION: ICD-10-CM

## 2024-06-28 DIAGNOSIS — Z11.59 NEED FOR HEPATITIS C SCREENING TEST: ICD-10-CM

## 2024-06-28 DIAGNOSIS — Z12.31 ENCOUNTER FOR SCREENING MAMMOGRAM FOR MALIGNANT NEOPLASM OF BREAST: ICD-10-CM

## 2024-06-28 DIAGNOSIS — Z11.3 SCREENING EXAMINATION FOR SEXUALLY TRANSMITTED DISEASE: ICD-10-CM

## 2024-06-28 ASSESSMENT — PATIENT HEALTH QUESTIONNAIRE - PHQ9: CLINICAL INTERPRETATION OF PHQ2 SCORE: 0

## 2024-06-28 NOTE — PROGRESS NOTES
Subjective:     CC:   Chief Complaint   Patient presents with    [facility] Care       HPI:   Rivka Kumar is a 64 y.o. female who presents for annual exam. She is feeling well and denies any complaints.    Ob-Gyn/ History:    Patient has GYN provider: Yes  /Para:    Last Pap Smear:  2023. No history of abnormal pap smears.  Gyn Surgery:  Tubal ligation, endometrial bioposy  Currently sexually active.  Last menstrual period: 10 years ago  No significant bloating/fluid retention, pelvic pain, or dyspareunia. No vaginal discharge  Post-menopausal bleeding: None  Urinary incontinence: None  Folate intake:     Health Maintenance  Advanced directive: N/A  Osteoporosis Screen/ DEXA: N/A  PT for falls prevention: N/A  Cholesterol Screening: Lipid panel order provided  Diabetes Screening: Fasting CMP order provided  Aspirin Use: N/A    Anticipatory Guidance  Diet: She reports that she a good diet. She describes it as well balanced.  Exercise: She exercises about 4 days a week. She does Crossfit and hikes  Substance Abuse: N/A  Safe in relationship.   Seat belts, bike helmet, gun safety discussed.  Sun protection used.  Dental Home.    Cancer screening  Colorectal Cancer Screening: Patient had a colonoscopy three years ago. Records requested.  Lung Cancer Screening: N/A  Cervical Cancer Screening: Patient had endometrial biopsy completed in 2023 with benign findings. Patient to schedule follow-up appointment with gynecology  Breast Cancer Screening: Order for mammogram provided today    Infectious disease screening/Immunizations  --STI Screening: Declined  --Practices safe sex.  --HIV Screening: Ordered today  --Hepatitis C Screening: Ordered today  --Immunizations:    Influenza: Recommended to receive in the fall   HPV:  N/A   Tetanus: Due next in 2030   Shingles: Patient has received the first dose, states she needs to receive the second dose   Pneumococcal: N/A   Hepatitis B: N/A  COVID-19:  Completed  Other immunizations: N/A    She  has a past medical history of Routine health maintenance (6/20/2013).  She  has a past surgical history that includes tubal coagulation laparoscopic bilateral.    Family History   Problem Relation Age of Onset    Diabetes Mother     Hypertension Mother     Stroke Mother     Diabetes Brother     Hypertension Brother     Alcohol abuse Brother     Cancer Neg Hx     Breast Cancer Neg Hx     Colorectal Cancer Neg Hx        Social History     Socioeconomic History    Marital status:      Spouse name: Not on file    Number of children: Not on file    Years of education: Not on file    Highest education level: Bachelor's degree (e.g., BA, AB, BS)   Occupational History    Not on file   Tobacco Use    Smoking status: Never    Smokeless tobacco: Never   Vaping Use    Vaping status: Never Used   Substance and Sexual Activity    Alcohol use: Yes     Alcohol/week: 0.6 oz     Types: 1 Glasses of wine per week     Comment: wine with dinner    Drug use: Never    Sexual activity: Yes     Partners: Male     Birth control/protection: Female Sterilization     Comment: works with post service   Other Topics Concern    Not on file   Social History Narrative    Not on file     Social Determinants of Health     Financial Resource Strain: Low Risk  (6/25/2024)    Overall Financial Resource Strain (CARDIA)     Difficulty of Paying Living Expenses: Not hard at all   Food Insecurity: No Food Insecurity (6/25/2024)    Hunger Vital Sign     Worried About Running Out of Food in the Last Year: Never true     Ran Out of Food in the Last Year: Never true   Transportation Needs: No Transportation Needs (6/25/2024)    PRAPARE - Transportation     Lack of Transportation (Medical): No     Lack of Transportation (Non-Medical): No   Physical Activity: Sufficiently Active (6/25/2024)    Exercise Vital Sign     Days of Exercise per Week: 4 days     Minutes of Exercise per Session: 60 min   Stress: Stress  Concern Present (6/25/2024)    [employer] - Occupational Stress Questionnaire     Feeling of Stress : To some extent   Social Connections: Moderately Integrated (6/25/2024)    Social Connection and Isolation Panel [NHANES]     Frequency of Communication with Friends and Family: More than three times a week     Frequency of Social Gatherings with Friends and Family: More than three times a week     Attends Christian Services: Never     Active Member of Clubs or Organizations: Yes     Attends Club or Organization Meetings: More than 4 times per year     Marital Status:    Intimate Partner Violence: Not on file   Housing Stability: Low Risk  (6/25/2024)    Housing Stability Vital Sign     Unable to Pay for Housing in the Last Year: No     Number of Places Lived in the Last Year: 1     Unstable Housing in the Last Year: No       There are no problems to display for this patient.        Current Outpatient Medications   Medication Sig Dispense Refill    cycloSPORINE (RESTASIS) 0.05 % ophthalmic emulsion Administer 1 Drop into both eyes 2 times a day.       No current facility-administered medications for this visit.     No Known Allergies    Review of Systems   Constitutional: Negative for fever, chills and malaise/fatigue.   HENT: Negative for congestion.    Eyes: Negative for pain.    Respiratory: Negative for cough and shortness of breath.  Cardiovascular: Negative for leg swelling.   Gastrointestinal: Negative for nausea, vomiting, abdominal pain and diarrhea.   Genitourinary: Negative for dysuria and hematuria.   Skin: Negative for rash.   Neurological: Negative for dizziness, focal weakness and headaches.   Endo/Heme/Allergies: Does not bleed easily.   Psychiatric/Behavioral: Negative for depression. The patient is not nervous/anxious.      Objective:     /70 (BP Location: Left arm, Patient Position: Sitting, BP Cuff Size: Adult)   Pulse (!) 50   Temp 36.7 °C (98 °F)  "(Temporal)   Ht 1.626 m (5' 4\")   Wt 57.2 kg (126 lb)   SpO2 98%   BMI 21.63 kg/m²   Body mass index is 21.63 kg/m².  Wt Readings from Last 4 Encounters:   06/28/24 57.2 kg (126 lb)   04/27/24 55 kg (121 lb 5.8 oz)   04/08/24 56.9 kg (125 lb 6.4 oz)   07/12/23 58.1 kg (128 lb)       Physical Exam:  Constitutional: Well-developed and well-nourished. Not diaphoretic. No distress.   Skin: Skin is warm and dry. No rash noted.  Head: Atraumatic without lesions.  Eyes: Conjunctivae and extraocular motions are normal. Pupils are equal, round, and reactive to light. No scleral icterus.   Ears:  External ears unremarkable. Bilateral cerumen  Nose: Nares patent. Septum midline. Turbinates without erythema nor edema. No discharge.   Mouth/Throat: Dentition is good. Tongue normal. Oropharynx is clear and moist. Posterior pharynx without erythema or exudates.  Neck: Supple, trachea midline. Normal range of motion. No thyromegaly present. No lymphadenopathy--cervical or supraclavicular.  Cardiovascular: Regular rate and rhythm, S1 and S2 without murmur, rubs, or gallops.  Lungs: Normal inspiratory effort, CTA bilaterally, no wheezes/rhonchi/rales  Breast: Deferred  Abdomen: Soft, non tender, and without distention. Active bowel sounds in all four quadrants. No rebound, guarding, masses or HSM.  : Deferred  Extremities: No cyanosis, clubbing, erythema, nor edema. Distal pulses intact and symmetric.   Musculoskeletal: All major joints AROM full in all directions without pain.  Neurological: Alert and oriented x 3. DTRs 2+/3 and symmetric. No cranial nerve deficit. 5/5 myotomes. Sensation intact.   Psychiatric:  Behavior, mood, and affect are appropriate.        Assessment and Plan:     1. Wellness examination  Presents today to establish care and for annual preventative wellness visit.  Records from gastroenterology requested from recent colonoscopy results.  Order for mammogram provided.  Patient had endometrial biopsy " completed in 9/2023 benign findings.  Annual lab orders provided.  Vaccinations discussed.  - Comp Metabolic Panel; Future  - Lipid Profile; Future    2. Encounter for screening mammogram for malignant neoplasm of breast  - MA-SCREENING MAMMO BILAT W/TOMOSYNTHESIS W/CAD; Future    3. Need for hepatitis C screening test  - HEP C VIRUS ANTIBODY; Future    4. Screening examination for sexually transmitted disease  - HIV AG/AB COMBO ASSAY SCREENING; Future      HCM:  Completed   Labs per orders  Immunizations per orders  Patient counseled about skin care, diet, supplements, prenatal vitamins, safe sex and exercise.          Follow-up: Return in about 1 year (around 6/28/2025) for Annual.

## 2024-06-28 NOTE — LETTER
[facility], [facility]  Meggan Isabel M.D.  [address] [place] [address]  Fax: [phone]   Authorization for Release/Disclosure of   Protected Health Information   Name: RIVKA RENTERIA : 1960 SSN: [id number]   Address: [address] [place] [address] Phone:    There are no phone numbers on file.   I authorize the entity listed below to release/disclose the PHI below to:   [facility]/Meggan Isbael M.D. and Meggan Isabel M.D.   Provider or Entity Name:  [facility]   Address   [facility], [facility], Zip  [address] [place] [address] Phone:  [phone]    Fax:     Reason for request: continuity of care   Information to be released:    [  ] LAST COLONOSCOPY,  including any PATH REPORT and follow-up  [  ] LAST FIT/COLOGUARD RESULT [  ] LAST DEXA  [  ] LAST MAMMOGRAM  [  ] LAST PAP  [  ] LAST LABS [  ] RETINA EXAM REPORT  [  ] IMMUNIZATION RECORDS  [xxxxxxx] Release all info      [  ] Check here and initial the line next to each item to release ALL health information INCLUDING  _____ Care and treatment for drug and / or alcohol abuse  _____ HIV testing, infection status, or AIDS  _____ Genetic Testing    DATES OF SERVICE OR TIME PERIOD TO BE DISCLOSED: _____________  I understand and acknowledge that:  * This Authorization may be revoked at any time by you in writing, except if your health information has already been used or disclosed.  * Your health information that will be used or disclosed as a result of you signing this authorization could be re-disclosed by the recipient. If this occurs, your re-disclosed health information may no longer be protected by State or Federal laws.  * You may refuse to sign this Authorization. Your refusal will not affect your ability to obtain treatment.  * This Authorization becomes effective upon signing and will  on (date) __________.      If no date is indicated, this Authorization will  one (1) year from the signature date.    Name: Rivka Espinal  José  Signature: Date:   6/28/2024     PLEASE FAX REQUESTED RECORDS BACK TO: [phone]

## 2024-07-25 ENCOUNTER — HOSPITAL ENCOUNTER (OUTPATIENT)
Dept: RADIOLOGY | Facility: MEDICAL CENTER | Age: 64
End: 2024-07-25
Attending: STUDENT IN AN ORGANIZED HEALTH CARE EDUCATION/TRAINING PROGRAM
Payer: COMMERCIAL

## 2024-07-25 DIAGNOSIS — Z12.31 ENCOUNTER FOR SCREENING MAMMOGRAM FOR MALIGNANT NEOPLASM OF BREAST: ICD-10-CM

## 2024-07-25 PROCEDURE — 77063 BREAST TOMOSYNTHESIS BI: CPT

## 2024-08-02 ENCOUNTER — APPOINTMENT (OUTPATIENT)
Dept: RADIOLOGY | Facility: MEDICAL CENTER | Age: 64
End: 2024-08-02
Attending: STUDENT IN AN ORGANIZED HEALTH CARE EDUCATION/TRAINING PROGRAM
Payer: COMMERCIAL

## 2024-08-02 DIAGNOSIS — R92.8 ABNORMAL MAMMOGRAM: ICD-10-CM

## 2024-08-02 PROCEDURE — 77065 DX MAMMO INCL CAD UNI: CPT | Mod: LT

## 2024-08-03 LAB
ALBUMIN SERPL-MCNC: 4.5 G/DL (ref 3.9–4.9)
ALP SERPL-CCNC: 88 IU/L (ref 44–121)
ALT SERPL-CCNC: 22 IU/L (ref 0–32)
AST SERPL-CCNC: 29 IU/L (ref 0–40)
BILIRUB SERPL-MCNC: 0.4 MG/DL (ref 0–1.2)
BUN SERPL-MCNC: 20 MG/DL (ref 8–27)
BUN/CREAT SERPL: 26 (ref 12–28)
CALCIUM SERPL-MCNC: 9.2 MG/DL (ref 8.7–10.3)
CHLORIDE SERPL-SCNC: 106 MMOL/L (ref 96–106)
CHOLEST SERPL-MCNC: 290 MG/DL (ref 100–199)
CO2 SERPL-SCNC: 22 MMOL/L (ref 20–29)
CREAT SERPL-MCNC: 0.78 MG/DL (ref 0.57–1)
EGFRCR SERPLBLD CKD-EPI 2021: 85 ML/MIN/1.73
GLOBULIN SER CALC-MCNC: 2.5 G/DL (ref 1.5–4.5)
GLUCOSE SERPL-MCNC: 99 MG/DL (ref 70–99)
HCV IGG SERPL QL IA: NON REACTIVE
HDLC SERPL-MCNC: 132 MG/DL
LDL CALC COMMENT:: ABNORMAL
LDLC SERPL CALC-MCNC: 146 MG/DL (ref 0–99)
POTASSIUM SERPL-SCNC: 5.7 MMOL/L (ref 3.5–5.2)
PROT SERPL-MCNC: 7 G/DL (ref 6–8.5)
SODIUM SERPL-SCNC: 141 MMOL/L (ref 134–144)
TRIGL SERPL-MCNC: 76 MG/DL (ref 0–149)
VLDLC SERPL CALC-MCNC: 12 MG/DL (ref 5–40)

## 2024-08-05 DIAGNOSIS — E87.5 HYPERKALEMIA: ICD-10-CM

## 2024-08-09 ENCOUNTER — HOSPITAL ENCOUNTER (OUTPATIENT)
Dept: RADIOLOGY | Facility: MEDICAL CENTER | Age: 64
End: 2024-08-09
Attending: STUDENT IN AN ORGANIZED HEALTH CARE EDUCATION/TRAINING PROGRAM
Payer: COMMERCIAL

## 2024-08-09 DIAGNOSIS — R92.8 ABNORMAL FINDINGS ON DIAGNOSTIC IMAGING OF BREAST: ICD-10-CM

## 2024-08-09 LAB — PATHOLOGY CONSULT NOTE: NORMAL

## 2024-08-09 PROCEDURE — A4648 IMPLANTABLE TISSUE MARKER: HCPCS

## 2024-08-09 PROCEDURE — 88342 IMHCHEM/IMCYTCHM 1ST ANTB: CPT

## 2024-08-09 PROCEDURE — 88305 TISSUE EXAM BY PATHOLOGIST: CPT

## 2024-08-09 PROCEDURE — 88341 IMHCHEM/IMCYTCHM EA ADD ANTB: CPT

## 2024-08-09 PROCEDURE — 88360 TUMOR IMMUNOHISTOCHEM/MANUAL: CPT

## 2024-08-10 LAB
BUN SERPL-MCNC: 17 MG/DL (ref 8–27)
BUN/CREAT SERPL: 22 (ref 12–28)
CALCIUM SERPL-MCNC: 9.2 MG/DL (ref 8.7–10.3)
CHLORIDE SERPL-SCNC: 107 MMOL/L (ref 96–106)
CO2 SERPL-SCNC: 22 MMOL/L (ref 20–29)
CREAT SERPL-MCNC: 0.78 MG/DL (ref 0.57–1)
EGFRCR SERPLBLD CKD-EPI 2021: 85 ML/MIN/1.73
GLUCOSE SERPL-MCNC: 90 MG/DL (ref 70–99)
POTASSIUM SERPL-SCNC: 4.3 MMOL/L (ref 3.5–5.2)
SODIUM SERPL-SCNC: 143 MMOL/L (ref 134–144)

## 2024-08-12 ENCOUNTER — PATIENT MESSAGE (OUTPATIENT)
Dept: MEDICAL GROUP | Facility: PHYSICIAN GROUP | Age: 64
End: 2024-08-12
Payer: COMMERCIAL

## 2024-08-12 ENCOUNTER — TELEPHONE (OUTPATIENT)
Dept: RADIOLOGY | Facility: MEDICAL CENTER | Age: 64
End: 2024-08-12
Payer: COMMERCIAL

## 2024-08-12 DIAGNOSIS — D05.10 DUCTAL CARCINOMA IN SITU (DCIS) OF BREAST, UNSPECIFIED LATERALITY: ICD-10-CM

## 2024-08-13 ENCOUNTER — TELEPHONE (OUTPATIENT)
Dept: RADIOLOGY | Facility: MEDICAL CENTER | Age: 64
End: 2024-08-13
Payer: COMMERCIAL

## 2024-08-15 ENCOUNTER — OFFICE VISIT (OUTPATIENT)
Dept: MEDICAL GROUP | Facility: PHYSICIAN GROUP | Age: 64
End: 2024-08-15
Payer: COMMERCIAL

## 2024-08-15 VITALS
HEIGHT: 64 IN | HEART RATE: 53 BPM | SYSTOLIC BLOOD PRESSURE: 120 MMHG | OXYGEN SATURATION: 98 % | WEIGHT: 125 LBS | DIASTOLIC BLOOD PRESSURE: 82 MMHG | BODY MASS INDEX: 21.34 KG/M2 | TEMPERATURE: 97 F

## 2024-08-15 DIAGNOSIS — D05.12 DUCTAL CARCINOMA IN SITU (DCIS) OF LEFT BREAST: ICD-10-CM

## 2024-08-15 PROCEDURE — 99214 OFFICE O/P EST MOD 30 MIN: CPT | Performed by: STUDENT IN AN ORGANIZED HEALTH CARE EDUCATION/TRAINING PROGRAM

## 2024-08-15 PROCEDURE — 3079F DIAST BP 80-89 MM HG: CPT | Performed by: STUDENT IN AN ORGANIZED HEALTH CARE EDUCATION/TRAINING PROGRAM

## 2024-08-15 PROCEDURE — 3074F SYST BP LT 130 MM HG: CPT | Performed by: STUDENT IN AN ORGANIZED HEALTH CARE EDUCATION/TRAINING PROGRAM

## 2024-08-15 ASSESSMENT — ENCOUNTER SYMPTOMS
SHORTNESS OF BREATH: 0
FEVER: 0
PALPITATIONS: 0
CHILLS: 0

## 2024-08-15 NOTE — PROGRESS NOTES
Subjective:   Verbal consent was acquired by the patient to use [employer] ambient listening note generation during this visit Yes     CC: Follow-up    History of Present Illness  Ms. Kumar is a pleasant 63 yo who presents today to discuss recent breast cancer diagnosis.     She has been experiencing some health challenges over the past couple of weeks. She expresses gratitude for the recommendation to undergo a mammogram, which led to a biopsy that tested positive for cancer. The results were initially sent to her via [employer], but she had difficulty understanding the medical terminology. Upon returning to the site after researching the terms, she found that the results had been removed. She is seeking a printed copy of these results.    She recalls that the report mentioned estrogen and progesterone receptors and is curious about the size of the tumor. She also inquires about the presence of white blood cells in her test results. She has an upcoming appointment with a breast surgeon next week and is scheduled to see Dr. Bourgeois. She is understandably anxious about the diagnosis.    She is considering genetic testing for her 35-year-old daughter due to concerns about heredity. She is generally in good health and rarely falls ill. She has been diligent about her health check-ups, although the COVID-19 pandemic has disrupted her routine. She is hopeful that she will not require a total mastectomy.    FAMILY HISTORY  She denies any family history of breast cancer. Her mother passed away at 72 from a stroke.    Patient Active Problem List    Diagnosis Date Noted    Ductal carcinoma in situ (DCIS) of left breast 08/15/2024     Health Maintenance: Completed    ROS:  Review of Systems   Constitutional:  Negative for chills and fever.   Respiratory:  Negative for shortness of breath.    Cardiovascular:  Negative for chest pain and palpitations.       Objective:     Exam:  /82 (BP Location: Left arm, Patient Position:  "Sitting, BP Cuff Size: Adult)   Pulse (!) 53   Temp 36.1 °C (97 °F) (Temporal)   Ht 1.626 m (5' 4\")   Wt 56.7 kg (125 lb)   SpO2 98%   BMI 21.46 kg/m²  Body mass index is 21.46 kg/m².    Physical Exam  Constitutional:       Appearance: Normal appearance.   Eyes:      Extraocular Movements: Extraocular movements intact.   Neurological:      Mental Status: She is alert.   Psychiatric:         Mood and Affect: Mood normal.             Assessment & Plan:     64 y.o. female with the following -     1. Ductal carcinoma in situ (DCIS) of left breast  Patient had screening Completed on 7/25/2024 with findings of calcifications in the 3 o'clock position of the left breast.  He had a diagnostic mammogram completed on 7/26/2024 which revealed suspicious left breast calcifications and biopsy was recommended.  Patient had biopsy completed with significant results of high grade ductal carcinoma in situ with necrosis and calcifications.  Estrogen and progesterone receptor positive.  Stat referral for surgical oncology was placed and patient is scheduled to see the breast surgeon on 8/21/2024.  Will follow with recommendations made by the breast surgeon.  Patient to follow-up closely.          Return in about 4 weeks (around 9/12/2024).    Please note that this dictation was created using voice recognition software. I have made every reasonable attempt to correct obvious errors, but I expect that there are errors of grammar and possibly content that I did not discover before finalizing the note.    "

## 2024-08-19 NOTE — PROGRESS NOTES
"    Subjective:   8/21/2024  6:48 AM  Primary care provider:  Meggan Isabel [place].D[place] [place]  [place]  [facility]     Chief Complaint:   Chief Complaint   Patient presents with    Breast Cancer       History of presenting illness:  This very nice 64 y.o. year old female is kindly referred for consultation regarding LEFT breast DCIS.  Prior to her biopsy, she had no palpable breast lump, nipple discharge, skin change, or other change on exam.  She did develop a lump after her biopsies, and mentions that they had to try three times through different approaches before they succeeded.  No previous breast biopsies, just some cyst aspirations.  No previous breast surgery.      She is extremely active and exercises regularly.  She does [employer] [employer] [employer] which includes quite a bit of running, TRX, and strength trains.  She hikes regularly and works in her yard.      The patient is accompanied by her  Matthew and her daughter Molly, who came prepared with a list of questions, following careful review of the pathology report.        LABS:  No results found for: \"HBA1C\"       IMAGING:  Screening mammo 7/25/24 [facility]:  Heterogen dense.  Last prior mammo 6/22.   LEFT 3:00 group of calcifications.   Bilateral benign appearing calcs including vascular, prev seen.      Left diagnostic mammo 8/2/24:   3:00:  Pleomorphic calcs, varying size and shape, with linear branching.  No associated mass.      Stereo biopsy 8/9/24:   Eight 9g cores.  HM COIL.  Small hematoma.     HG DCIS with necrosis and calcs.    ER %.  SC 11-20%.      No Known Allergies    Current Outpatient Medications   Medication Sig    cycloSPORINE (RESTASIS) 0.05 % ophthalmic emulsion Administer 1 Drop into both eyes 2 times a day.       Patient Active Problem List   Diagnosis    Ductal carcinoma in situ (DCIS) of left breast     Past Surgical History:   Procedure Laterality Date    TUBAL COAGULATION LAPAROSCOPIC BILATERAL  1999       Social History     Tobacco Use " "   Smoking status: Never    Smokeless tobacco: Never   Vaping Use    Vaping status: Never Used   Substance Use Topics    Alcohol use: Yes     Alcohol/week: 4.2 oz     Types: 7 Glasses of wine per week     Comment: wine with dinner    Drug use: Never     Family and Occupational History     Socioeconomic History    Marital status:      Spouse name: Not on file    Number of children: Not on file    Years of education: Not on file    Highest education level: Bachelor's degree (e.g., BA, AB, BS)   Occupational History    Not on file      OB History    Para Term  AB Living   1 1 1 0 0 1   SAB IAB Ectopic Molar Multiple Live Births   0 0 0 0 0 0   Obstetric Comments   Age of first delivery: 28   Menarche: 11   LMP: , no HRT       Family History   Problem Relation Age of Onset    Diabetes Mother     Hypertension Mother     Stroke Mother     Diabetes Brother     Hypertension Brother     Alcohol abuse Brother     Cancer Neg Hx     Breast Cancer Neg Hx     Colorectal Cancer Neg Hx        Review of Systems   Eyes:         Dry eyes   All other systems reviewed and are negative.       Objective:   BP (!) 158/94 (BP Location: Left arm, Patient Position: Sitting, BP Cuff Size: Large adult)   Pulse 62   Temp 36.6 °C (97.8 °F) (Temporal)   Ht 1.626 m (5' 4\")   Wt 56.9 kg (125 lb 6.4 oz)   SpO2 98%   BMI 21.52 kg/m²       Physical Exam  Constitutional:       Appearance: Normal appearance.   Cardiovascular:      Rate and Rhythm: Regular rhythm.      Heart sounds: Normal heart sounds.   Pulmonary:      Effort: Pulmonary effort is normal.      Breath sounds: Normal breath sounds.   Skin:     Comments: See scanned breast diagram   Neurological:      Mental Status: She is alert.   Psychiatric:         Mood and Affect: Mood normal.         Behavior: Behavior normal.         Diagnosis:     1. Ductal carcinoma in situ (DCIS) of left breast            Medical Decision Making:  Today's Assessment / Status / Plan: "     ASSESSMENT:  LEFT lateral (3:00) HG DCIS with necrosis and calcs. Clinical Stage 0 (cTis cN0 M0).  Stereo biopsy 24.    Approximately 2cm total area, kpre-biopsy.  HM COIL present superficial to a small hematoma on bedside US.       ER %.  VA 11-20%.    Last bilateral mammogram 24 [facility]:  Heterogen dense.  Last prior mammo .    FH:  No known malignancy, though father's history is unknown.  No prior genetic testing.  Refer to Genetics.      Menopausal/HRT status:  .  Age of first delivery: 28   Menarche: 11   LMP: , no HRT     LIFESTYLE:  No smoking or vaping history.  7 alcohol per week.  She has one glass of red wine daily, encouraged by her  , in the hope of improving her health.  Discussed current data on alcohol and risk of breast cancer.  No drug history.  BMI 21.  Exercises regularly and stays very active.  Generally healthy and balanced approach to food.      ECOG 0= Fully active, able to carry on all pre-disease performance without restriction.     DISCUSSED:  I reviewed breast imaging, pathology reports, and relevant outside records.  We discussed the diagnosis, clinical stage, and options for management per NCCN guidelines.  I explained that the clinical (pre-surgical) stage can be different from the pathologic stage which is typically determined after surgery. While today's discussion will be focused primarily on surgical options, the post-surgical therapy can also be important to reduce the risk of the cancer recurring or spreading.  Treatment can involves a combination of surgery, medical therapy, and/or radiation therapy, but we try to tailor each patient's treatment plan to their individual situation.  We discussed the features of her particular diagnosis and how that might affect her individual prognosis.     I explained that while the working diagnosis is DCIS based on the core biopsy, evaluation of the surgical specimen can reveal invasive cancer, which  can change the stage, prognosis and treatment options.  She may need further surgery or other treatment if invasive cancer is identified on the surgical specimen, which could also change her prognosis.  However, if this is truly just Stage 0 disease, then her overall prognosis should be excellent. Illustrations were used to explain the difference between DCIS and invasive carcinoma.    We discussed usual surgical options including breast conservation (BCS) with partial mastectomy and breast irradiation (XRT), versus total mastectomy (TM, removal of the breast on one side).  There are some instances in which radiation following mastectomy might be recommended.  I explained the risks and benefits of each approach, as well as differences in recovery/restrictions. Radiation lowers the risk of same breast recurrence and is felt to provide comparable outcomes overall when added to breast conserving surgery.  I explained the role of wire localization.  She currently has a post-biopsy hematoma, but hopefully this will continue to resolve over the next few weeks.     I explained the possibility of needing further surgery if margins are not clear which is sometimes only discovered on microscopic evaluation after surgery.  Margins refer to the edge of the specimen which are considered “free” or “clear” if  there is no tumor cell present there.  This is typically not able to be determined with 100% accuracy during surgery, as the complete pathologic analysis does require multiple steps over days.  However, we can lower this risk of needing additional surgery by excising generously or taking additional margins as needed.  This can lead to a larger defect and more significant change such as dimpling/divoting, distortion, and asymmetry.  I emphasized the difficulty with DCIS (non-invasive breast cancer) in particular, as microscopic disease is not visible to the naked eye during surgery.      I explained the risks and benefits of  a more direct incision which could have lower risk for bleeding and swelling, and require less time under anesthesia, but could leave a visible scar in a more exposed location.  We discussed the role of reconstructive or oncoplastic approaches designed to minimize contour defects and improve overall cosmesis.  Sometimes a mastopexy, mammoplasty, tissue transfer, local tissue rearrangement, or dermoglandular flap reconstruction can be performed to improve surgical access to the area of interest with a more aesthetically appealing outcome.  Sometimes this can result in repositioning or reshaping of the NAC, but could hide the surgical scar better.  However, I emphasized the difference between this and cosmetic surgery as done by a plastic surgeon.  If only done on one side, this can lead to asymmetry.  I explained the differences of these options regarding risk for bleeding/swelling, recovery, and other considerations.  If she finds the asymmetry or overall aesthetic outcome to be unsatisfying, we can refer her to a plastic surgeon (MELISSA) at a later date to discuss further reconstructive options including symmetry procedures.      Given the area of expected resection compared to breast size, partial mastectomy might be technically feasible, but sometimes leads to a less satisfactory aesthetic result.  She could be a candidate for nipple sparing mastectomy (NSM), with or without reconstruction.  However, ultimately she strongly prefers breast conservation and feels her main goal is to get back to her normal active lifestyle.  She indicates that she is less concerned about the size/visibility of scar or overall appearance, and understands she could have a significant defect and indentation that may be permanent.  When breast conservation is planned, axillary staging is not typically required, given the risks versus benefits.  If invasive cancer is subsequently diagnosed, we may discuss return to surgery for axillary  staging if indicated, which she acknowledges. She firmly declines reconstructive surgery at this time, and assures me she is unconcerned about cosmetic outcome.      Typically, Medical Oncology consultation occurs after surgery to discuss risk reducing medication.  I explained that when indicated, the radiation oncologist typically meets with the patient following surgery to discuss options for treatment, once the final pathology and margin status are known, which can guide radiation therapy recommendations.  I reviewed that even with clear margins, this is part of conventional planned treatment for breast conserving therapy.  We will hold the referral until we know the margin status.      We discussed the role of Genetics evaluation for certain high risk gene mutations such as BRCA or other relevant variants, and the significance of a positive result both for the patient and their family.  There are also gene mutations associated with non-breast cancers that could impact screening or other decisions.  Per current guidelines, we offered referral to discuss appropriateness for genetic testing and options.  She would like to proceed.    We will also refer to the hospital nurse navigator for further evaluation and assistance with barriers to care as needed.  Information was offered regarding the educational classes provided by the hospital, which we advised her to attend.  We also explained the importance of compression for several weeks after surgery and advised her to obtain some snug-fitting compression/sports bras to be worn immediately after surgery.  We provided additional information including commonly suggested options.  We discussed the association between regular alcohol use and breast cancer risk, and advised her to significantly decrease for best overall outcomes.  However, we also discussed the benefits of social interaction which we recognize can sometimes include alcohol.  Ultimately, lifestyle  modifications remain a personal choice.  She is already quite active and generally healthy, though mentions that both her LDL and HDL are high.  She would like to meet with someone from Oncology Nutrition services.      Total time spent today by me, including personal review and independent interpretation of available breast imaging and pathology report, outside records, face to face time (65min for this alone), coordination of care, chart documentation, and , was 120 minutes.  Ample time was provided for questions, and a written outline of the treatment plan was provided, along with a list of online resources for additional information.      PRESCRIPTION FOR CONTROLLED SUBSTANCE:  This patient may develop post-procedure pain that could exceed the capabilities of non-opioid medications such as acetaminophen, ibuprofen, or naproxen.  This patient may also require an anxiolytic for pre-procedure anxiety.  There are no sufficient alternatives to this medication that are currently available.  The patient understands that the purpose of opioid medication is to improve initial post-procedure symptoms, then transition to alternative forms of treatment, and is not intended for long-term use.  The patient has been advised not to use the controlled substances with alcohol, marijuana products, or other illicit drugs.  I have reviewed available medical records from this patient's other providers and have directed my staff to obtain pertinent medical records as we are made aware.  After performing my evaluation and risk assessment, I feel that the controlled substances to be prescribed are appropriate.  Drug utilization report is reviewed prior to written prescriptions.  The patient was given the opportunity to ask questions regarding controlled substances.      PLAN:  Wire loc LEFT partial mastectomy, possible mastopexy.  Post biopsy hematoma currently present.  Options for incisions include radial, IMF, Caitlin or  donut mastopexies.  Will try to balance best immediate outcomes with possibility of needing future surgery including TM.        REFERRALS:    Genetics  Medical Oncology to discuss adjuvant/risk reducing options.  Oncology nutrition services  Oncology navigation

## 2024-08-21 ENCOUNTER — OFFICE VISIT (OUTPATIENT)
Dept: SURGERY | Facility: MEDICAL CENTER | Age: 64
End: 2024-08-21
Payer: COMMERCIAL

## 2024-08-21 VITALS
BODY MASS INDEX: 21.41 KG/M2 | HEIGHT: 64 IN | WEIGHT: 125.4 LBS | DIASTOLIC BLOOD PRESSURE: 94 MMHG | OXYGEN SATURATION: 98 % | TEMPERATURE: 97.8 F | SYSTOLIC BLOOD PRESSURE: 158 MMHG | HEART RATE: 62 BPM

## 2024-08-21 DIAGNOSIS — D05.12 DUCTAL CARCINOMA IN SITU (DCIS) OF LEFT BREAST: ICD-10-CM

## 2024-08-21 PROCEDURE — 3077F SYST BP >= 140 MM HG: CPT | Performed by: SURGERY

## 2024-08-21 PROCEDURE — 3080F DIAST BP >= 90 MM HG: CPT | Performed by: SURGERY

## 2024-08-21 PROCEDURE — 99205 OFFICE O/P NEW HI 60 MIN: CPT | Performed by: SURGERY

## 2024-08-21 PROCEDURE — 99417 PROLNG OP E/M EACH 15 MIN: CPT | Performed by: SURGERY

## 2024-08-22 ENCOUNTER — DOCUMENTATION (OUTPATIENT)
Dept: RADIATION ONCOLOGY | Facility: MEDICAL CENTER | Age: 64
End: 2024-08-22
Payer: COMMERCIAL

## 2024-08-22 ENCOUNTER — PATIENT OUTREACH (OUTPATIENT)
Dept: ONCOLOGY | Facility: MEDICAL CENTER | Age: 64
End: 2024-08-22
Payer: COMMERCIAL

## 2024-08-22 NOTE — PROGRESS NOTES
Oncology Nurse Navigation Assessment  Phoned pt for follow up.  She is scheduled for surgery with Dr. Bourgeois on 9/10/24.    DX: DCIS of the left breast  HR positive    POC: left partial mastectomy / sentinel lymph node biopsy    FAMHX: Cancer-related family history is negative for Cancer.             BARRIERS ASSESSMENT:    TRANSPORTATION:  will accompany her to surgery  EMPLOYMENT:  retired   FINANCIAL:  [employer]   INSURANCE:  [employer]  SUPPORT SYSTEM:  , daughter Molly  PSYCHOLOGICAL: dst by [facility]  COMMUNICATION: no barrier noted    FAMILY CARE:  denies barriers  SELF CARE:  denies barriers         INTERVENTION:  Intro letter sent via My Chart   Referral to Financial Resource Advocate

## 2024-08-22 NOTE — PROGRESS NOTES
Nutrition Services: New Referral Alert   Rivka Kumar is a 64 y.o. female with diagnosis of ductal carcinoma of left breast  .     Referral received for nutrition services. Due to high volume of referrals, please allow for up to 14 days for initial RD consultation per policy. RD will attempt to see at next oncology appointment or will contact per policy for nutrition assessment.    Please contact as needed.  [phone]

## 2024-08-23 ENCOUNTER — DOCUMENTATION (OUTPATIENT)
Dept: SURGERY | Facility: MEDICAL CENTER | Age: 64
End: 2024-08-23
Payer: COMMERCIAL

## 2024-08-26 ENCOUNTER — PATIENT OUTREACH (OUTPATIENT)
Dept: ONCOLOGY | Facility: MEDICAL CENTER | Age: 64
End: 2024-08-26
Payer: COMMERCIAL

## 2024-08-28 ENCOUNTER — APPOINTMENT (OUTPATIENT)
Dept: ADMISSIONS | Facility: MEDICAL CENTER | Age: 64
End: 2024-08-28
Attending: SURGERY
Payer: COMMERCIAL

## 2024-08-28 ENCOUNTER — NON-PROVIDER VISIT (OUTPATIENT)
Dept: GENETICS | Facility: MEDICAL CENTER | Age: 64
End: 2024-08-28
Payer: COMMERCIAL

## 2024-08-28 NOTE — PROGRESS NOTES
Rivka Kumar is a 64 y.o. female here for a non-provider visit for: Lab Draws  on 8/28/2024 at 11:19 AM    Procedure Performed: Venipuncture     Anatomical site: Right Antecubital Area (AC)    Equipment used: 25 butterfly    Labs drawn: [employer] (two lavender EDTA tubes)    Ordering Provider: [employer]    Tariq By: Candi Bell, Med Ass't

## 2024-08-29 ENCOUNTER — DOCUMENTATION (OUTPATIENT)
Dept: RADIATION ONCOLOGY | Facility: MEDICAL CENTER | Age: 64
End: 2024-08-29
Payer: COMMERCIAL

## 2024-08-29 NOTE — PROGRESS NOTES
Nutrition Services: RD Consultation/ Telephone Encounter    Rivka Kumar is a 64 y.o. female with diagnosis of ductal Carcinoma in situ of left breast       Nutrition Assessment:      RD able to call to introduce self and nutrition services. Pt reports current treatment plan is surgery with potential radiation therapy thereafter. Expresses concern regarding her cholesterol levels. Mentions is quite active throughout the week through [employer]. Confirms has energy to continue through her activity and enjoys it. Reports adequate hydration, mentions was drinking a lot of potassium supplemented beverages which may have resulted in the excess potassium lab, though this has since normalized when switching to plain water. Reports a family history of high cholesterol and diabetes. States PCP recommended consideration of medication to manage cholesterol. Pt is not taking any vitamins and minerals currently. Reports a previous hx of vitamin D deficiency.Pt did not express any further nutrition-related questions or concerns at this time.     Food/ Nutrition-related History:  No food allergies or intolerances reported.   Breakfast: eggs with thomas and toast or breakfast burrito. May otherwise consist of oatmeal, Greek yogurt with fruit, bagels or English Muffin  Dinner: vegetables, meat, pasta.    Estimates 3/4 cup of vegetables and 1/2 cup of fruit  per day.     Past Medical History:   Diagnosis Date    Hyperlipidemia     Routine health maintenance 06/20/2013    BMI Mammogram 10/2012 Colonoscopy -- @ 51 to repeat in 5 years PAP smear 2012 always nrmal Skin lesions to see the dermatologist Immunizations Influenza never Tetanus does not recall           Past Surgical History:   Procedure Laterality Date    TUBAL COAGULATION LAPAROSCOPIC BILATERAL  1999     Biochemical/ Anthropometrics  Treatment Regimen: Wire loc LEFT partial mastectomy, possible mastopexy on 9/10/24  Pertinent Labs: Total cholesterol 290, LDL cholesterol  "146,   Pertinent Meds: none  Wt Readings from Last 1 Encounters:   08/21/24 56.9 kg (125 lb 6.4 oz)      Ht Readings from Last 1 Encounters:   08/21/24 1.626 m (5' 4\")      BMI Readings from Last 1 Encounters:   08/21/24 21.52 kg/m²   BMI Classification: WNL  Nutrition-Focused Physical Exam: Unable to assess given telephone encounter    Weight History:  Wt Readings from Last 6 Encounters:   08/21/24 56.9 kg (125 lb 6.4 oz)   08/15/24 56.7 kg (125 lb)   06/28/24 57.2 kg (126 lb)   04/27/24 55 kg (121 lb 5.8 oz)   04/08/24 56.9 kg (125 lb 6.4 oz)   07/12/23 58.1 kg (128 lb)     Weight Change/Malnutrition Risk: No significant weight changes within past 1 year        Nutrition Interventions:      Introduced self and discussed role of dietitian throughout treatment process   Discussed current research regarding LDL cholesterol and impacts of nutrition. Current consensus remains that saturated fat likely to play a role in LD cholesterol. Recommend mindfulness of saturated fat, which is found in animal products.   Encouraged balanced diet with plant-based focus. RD discussed benefit of increase in soluble fiber from fruits, vegetables, plant-protein, and whole grains. Recommended aiming for 5 servings of fruits and vegetables per day.   Advised food first approach, though may consider use of psyllium fiber or probiotic to further reduce LDL levels.  Pt with high HDL levels, which may indicate impairment in its functionality, though research states can also be elevated with alcohol intake, diabetes, and hypothyroidism. RD to defer management of HDL if warranted by PCP.  RD discussed potential benefit of checking vitamin D levels given hx of deficiency and its potential role in lipid levels. RD to discuss with pt's PCP, pt in agreement.   Encouraged continuation physical activity as tolerated with emphasis on reducing long periods of sedentary activity as able.   RD to send information via [employer] with contact " information for follow-up as desired.     Nutrition Monitoring and Evaluation:     Dietary pattern as prescribed by RD  Weight stabilization throughout treatment  Appropriate management of side effects through medication management and nutrition modification as warranted    Pt reports understanding and was receptive to information provided.   RD available PRN   [phone]

## 2024-08-30 ENCOUNTER — HOSPITAL ENCOUNTER (OUTPATIENT)
Dept: LAB | Facility: MEDICAL CENTER | Age: 64
End: 2024-08-30
Attending: SPECIALIST
Payer: COMMERCIAL

## 2024-08-30 DIAGNOSIS — D05.12 DUCTAL CARCINOMA IN SITU (DCIS) OF LEFT BREAST: ICD-10-CM

## 2024-08-30 LAB
ERYTHROCYTE [DISTWIDTH] IN BLOOD BY AUTOMATED COUNT: 43.8 FL (ref 35.9–50)
HCT VFR BLD AUTO: 47.6 % (ref 37–47)
HGB BLD-MCNC: 15.4 G/DL (ref 12–16)
MCH RBC QN AUTO: 30.9 PG (ref 27–33)
MCHC RBC AUTO-ENTMCNC: 32.4 G/DL (ref 32.2–35.5)
MCV RBC AUTO: 95.6 FL (ref 81.4–97.8)
PLATELET # BLD AUTO: 274 K/UL (ref 164–446)
PMV BLD AUTO: 10.2 FL (ref 9–12.9)
RBC # BLD AUTO: 4.98 M/UL (ref 4.2–5.4)
WBC # BLD AUTO: 6.5 K/UL (ref 4.8–10.8)

## 2024-08-30 PROCEDURE — 85027 COMPLETE CBC AUTOMATED: CPT

## 2024-08-30 PROCEDURE — 36415 COLL VENOUS BLD VENIPUNCTURE: CPT

## 2024-09-03 ENCOUNTER — APPOINTMENT (OUTPATIENT)
Dept: RADIOLOGY | Facility: MEDICAL CENTER | Age: 64
End: 2024-09-03
Attending: SURGERY
Payer: COMMERCIAL

## 2024-09-03 ENCOUNTER — ANESTHESIA EVENT (OUTPATIENT)
Dept: SURGERY | Facility: MEDICAL CENTER | Age: 64
End: 2024-09-03
Payer: COMMERCIAL

## 2024-09-03 ENCOUNTER — ANESTHESIA (OUTPATIENT)
Dept: SURGERY | Facility: MEDICAL CENTER | Age: 64
End: 2024-09-03
Payer: COMMERCIAL

## 2024-09-03 ENCOUNTER — PHARMACY VISIT (OUTPATIENT)
Dept: PHARMACY | Facility: MEDICAL CENTER | Age: 64
End: 2024-09-03
Payer: MEDICARE

## 2024-09-03 ENCOUNTER — HOSPITAL ENCOUNTER (OUTPATIENT)
Facility: MEDICAL CENTER | Age: 64
End: 2024-09-03
Attending: SURGERY | Admitting: SURGERY
Payer: COMMERCIAL

## 2024-09-03 VITALS
WEIGHT: 127.21 LBS | DIASTOLIC BLOOD PRESSURE: 68 MMHG | SYSTOLIC BLOOD PRESSURE: 148 MMHG | HEART RATE: 61 BPM | HEIGHT: 64 IN | BODY MASS INDEX: 21.72 KG/M2 | TEMPERATURE: 96.9 F | RESPIRATION RATE: 16 BRPM | OXYGEN SATURATION: 95 %

## 2024-09-03 DIAGNOSIS — D05.12 INTRADUCTAL CARCINOMA IN SITU OF LEFT BREAST: ICD-10-CM

## 2024-09-03 DIAGNOSIS — D05.12 DUCTAL CARCINOMA IN SITU OF LEFT BREAST: ICD-10-CM

## 2024-09-03 LAB — EKG IMPRESSION: NORMAL

## 2024-09-03 PROCEDURE — 700101 HCHG RX REV CODE 250: Performed by: SURGERY

## 2024-09-03 PROCEDURE — 19316 MASTOPEXY: CPT | Mod: LT | Performed by: SURGERY

## 2024-09-03 PROCEDURE — RXMED WILLOW AMBULATORY MEDICATION CHARGE: Performed by: SPECIALIST

## 2024-09-03 PROCEDURE — 160009 HCHG ANES TIME/MIN: Performed by: SURGERY

## 2024-09-03 PROCEDURE — 700101 HCHG RX REV CODE 250: Performed by: ANESTHESIOLOGY

## 2024-09-03 PROCEDURE — 93010 ELECTROCARDIOGRAM REPORT: CPT | Performed by: STUDENT IN AN ORGANIZED HEALTH CARE EDUCATION/TRAINING PROGRAM

## 2024-09-03 PROCEDURE — 700102 HCHG RX REV CODE 250 W/ 637 OVERRIDE(OP): Performed by: ANESTHESIOLOGY

## 2024-09-03 PROCEDURE — 160048 HCHG OR STATISTICAL LEVEL 1-5: Performed by: SURGERY

## 2024-09-03 PROCEDURE — 700111 HCHG RX REV CODE 636 W/ 250 OVERRIDE (IP): Performed by: ANESTHESIOLOGY

## 2024-09-03 PROCEDURE — 160035 HCHG PACU - 1ST 60 MINS PHASE I: Performed by: SURGERY

## 2024-09-03 PROCEDURE — 160025 RECOVERY II MINUTES (STATS): Performed by: SURGERY

## 2024-09-03 PROCEDURE — 76098 X-RAY EXAM SURGICAL SPECIMEN: CPT | Mod: LT

## 2024-09-03 PROCEDURE — 160002 HCHG RECOVERY MINUTES (STAT): Performed by: SURGERY

## 2024-09-03 PROCEDURE — 93005 ELECTROCARDIOGRAM TRACING: CPT | Performed by: SURGERY

## 2024-09-03 PROCEDURE — 160028 HCHG SURGERY MINUTES - 1ST 30 MINS LEVEL 3: Performed by: SURGERY

## 2024-09-03 PROCEDURE — 19301 PARTIAL MASTECTOMY: CPT | Mod: LT | Performed by: SURGERY

## 2024-09-03 PROCEDURE — 19281 PERQ DEVICE BREAST 1ST IMAG: CPT | Mod: LT

## 2024-09-03 PROCEDURE — 19301 PARTIAL MASTECTOMY: CPT | Mod: AS,LT | Performed by: SPECIALIST

## 2024-09-03 PROCEDURE — 88307 TISSUE EXAM BY PATHOLOGIST: CPT | Mod: 59

## 2024-09-03 PROCEDURE — A9270 NON-COVERED ITEM OR SERVICE: HCPCS | Performed by: ANESTHESIOLOGY

## 2024-09-03 PROCEDURE — 19316 MASTOPEXY: CPT | Mod: AS,LT | Performed by: SPECIALIST

## 2024-09-03 PROCEDURE — 700105 HCHG RX REV CODE 258: Performed by: SURGERY

## 2024-09-03 PROCEDURE — 160039 HCHG SURGERY MINUTES - EA ADDL 1 MIN LEVEL 3: Performed by: SURGERY

## 2024-09-03 PROCEDURE — 160046 HCHG PACU - 1ST 60 MINS PHASE II: Performed by: SURGERY

## 2024-09-03 RX ORDER — HYDROMORPHONE HYDROCHLORIDE 1 MG/ML
0.1 INJECTION, SOLUTION INTRAMUSCULAR; INTRAVENOUS; SUBCUTANEOUS
Status: DISCONTINUED | OUTPATIENT
Start: 2024-09-03 | End: 2024-09-03 | Stop reason: HOSPADM

## 2024-09-03 RX ORDER — ONDANSETRON 2 MG/ML
INJECTION INTRAMUSCULAR; INTRAVENOUS PRN
Status: DISCONTINUED | OUTPATIENT
Start: 2024-09-03 | End: 2024-09-03 | Stop reason: SURG

## 2024-09-03 RX ORDER — LIDOCAINE HYDROCHLORIDE 20 MG/ML
INJECTION, SOLUTION EPIDURAL; INFILTRATION; INTRACAUDAL; PERINEURAL PRN
Status: DISCONTINUED | OUTPATIENT
Start: 2024-09-03 | End: 2024-09-03 | Stop reason: SURG

## 2024-09-03 RX ORDER — GLYCOPYRROLATE 0.2 MG/ML
INJECTION INTRAMUSCULAR; INTRAVENOUS PRN
Status: DISCONTINUED | OUTPATIENT
Start: 2024-09-03 | End: 2024-09-03 | Stop reason: SURG

## 2024-09-03 RX ORDER — BUPIVACAINE HYDROCHLORIDE AND EPINEPHRINE 5; 5 MG/ML; UG/ML
INJECTION, SOLUTION EPIDURAL; INTRACAUDAL; PERINEURAL
Status: DISCONTINUED
Start: 2024-09-03 | End: 2024-09-03 | Stop reason: HOSPADM

## 2024-09-03 RX ORDER — MIDAZOLAM HYDROCHLORIDE 1 MG/ML
INJECTION INTRAMUSCULAR; INTRAVENOUS PRN
Status: DISCONTINUED | OUTPATIENT
Start: 2024-09-03 | End: 2024-09-03 | Stop reason: SURG

## 2024-09-03 RX ORDER — HYDROMORPHONE HYDROCHLORIDE 1 MG/ML
0.2 INJECTION, SOLUTION INTRAMUSCULAR; INTRAVENOUS; SUBCUTANEOUS
Status: DISCONTINUED | OUTPATIENT
Start: 2024-09-03 | End: 2024-09-03 | Stop reason: HOSPADM

## 2024-09-03 RX ORDER — OXYCODONE HCL 5 MG/5 ML
10 SOLUTION, ORAL ORAL
Status: DISCONTINUED | OUTPATIENT
Start: 2024-09-03 | End: 2024-09-03 | Stop reason: HOSPADM

## 2024-09-03 RX ORDER — CEFAZOLIN SODIUM 1 G/3ML
INJECTION, POWDER, FOR SOLUTION INTRAMUSCULAR; INTRAVENOUS PRN
Status: DISCONTINUED | OUTPATIENT
Start: 2024-09-03 | End: 2024-09-03 | Stop reason: SURG

## 2024-09-03 RX ORDER — SCOLOPAMINE TRANSDERMAL SYSTEM 1 MG/1
1 PATCH, EXTENDED RELEASE TRANSDERMAL
Status: DISCONTINUED | OUTPATIENT
Start: 2024-09-03 | End: 2024-09-03 | Stop reason: HOSPADM

## 2024-09-03 RX ORDER — BUPIVACAINE HYDROCHLORIDE AND EPINEPHRINE 5; 5 MG/ML; UG/ML
INJECTION, SOLUTION EPIDURAL; INTRACAUDAL; PERINEURAL
Status: DISCONTINUED | OUTPATIENT
Start: 2024-09-03 | End: 2024-09-03 | Stop reason: HOSPADM

## 2024-09-03 RX ORDER — DIPHENHYDRAMINE HYDROCHLORIDE 50 MG/ML
12.5 INJECTION INTRAMUSCULAR; INTRAVENOUS
Status: DISCONTINUED | OUTPATIENT
Start: 2024-09-03 | End: 2024-09-03 | Stop reason: HOSPADM

## 2024-09-03 RX ORDER — MEPERIDINE HYDROCHLORIDE 25 MG/ML
6.25 INJECTION INTRAMUSCULAR; INTRAVENOUS; SUBCUTANEOUS
Status: DISCONTINUED | OUTPATIENT
Start: 2024-09-03 | End: 2024-09-03 | Stop reason: HOSPADM

## 2024-09-03 RX ORDER — DEXAMETHASONE SODIUM PHOSPHATE 4 MG/ML
INJECTION, SOLUTION INTRA-ARTICULAR; INTRALESIONAL; INTRAMUSCULAR; INTRAVENOUS; SOFT TISSUE PRN
Status: DISCONTINUED | OUTPATIENT
Start: 2024-09-03 | End: 2024-09-03 | Stop reason: SURG

## 2024-09-03 RX ORDER — HALOPERIDOL 5 MG/ML
1 INJECTION INTRAMUSCULAR
Status: DISCONTINUED | OUTPATIENT
Start: 2024-09-03 | End: 2024-09-03 | Stop reason: HOSPADM

## 2024-09-03 RX ORDER — EPHEDRINE SULFATE 50 MG/ML
INJECTION, SOLUTION INTRAVENOUS PRN
Status: DISCONTINUED | OUTPATIENT
Start: 2024-09-03 | End: 2024-09-03 | Stop reason: SURG

## 2024-09-03 RX ORDER — OXYCODONE HCL 5 MG/5 ML
5 SOLUTION, ORAL ORAL
Status: DISCONTINUED | OUTPATIENT
Start: 2024-09-03 | End: 2024-09-03 | Stop reason: HOSPADM

## 2024-09-03 RX ORDER — HYDRALAZINE HYDROCHLORIDE 20 MG/ML
5 INJECTION INTRAMUSCULAR; INTRAVENOUS
Status: DISCONTINUED | OUTPATIENT
Start: 2024-09-03 | End: 2024-09-03 | Stop reason: HOSPADM

## 2024-09-03 RX ORDER — ALBUTEROL SULFATE 5 MG/ML
2.5 SOLUTION RESPIRATORY (INHALATION)
Status: DISCONTINUED | OUTPATIENT
Start: 2024-09-03 | End: 2024-09-03 | Stop reason: HOSPADM

## 2024-09-03 RX ORDER — ONDANSETRON 2 MG/ML
4 INJECTION INTRAMUSCULAR; INTRAVENOUS
Status: DISCONTINUED | OUTPATIENT
Start: 2024-09-03 | End: 2024-09-03 | Stop reason: HOSPADM

## 2024-09-03 RX ORDER — ONDANSETRON 4 MG/1
4 TABLET, FILM COATED ORAL EVERY 6 HOURS PRN
Qty: 12 TABLET | Refills: 0 | Status: SHIPPED | OUTPATIENT
Start: 2024-09-03 | End: 2024-09-11

## 2024-09-03 RX ORDER — SODIUM CHLORIDE, SODIUM LACTATE, POTASSIUM CHLORIDE, CALCIUM CHLORIDE 600; 310; 30; 20 MG/100ML; MG/100ML; MG/100ML; MG/100ML
INJECTION, SOLUTION INTRAVENOUS CONTINUOUS
Status: ACTIVE | OUTPATIENT
Start: 2024-09-03 | End: 2024-09-03

## 2024-09-03 RX ORDER — LABETALOL HYDROCHLORIDE 5 MG/ML
5 INJECTION, SOLUTION INTRAVENOUS
Status: DISCONTINUED | OUTPATIENT
Start: 2024-09-03 | End: 2024-09-03 | Stop reason: HOSPADM

## 2024-09-03 RX ORDER — TRAMADOL HYDROCHLORIDE 50 MG/1
50 TABLET ORAL EVERY 6 HOURS PRN
Qty: 12 TABLET | Refills: 0 | Status: SHIPPED | OUTPATIENT
Start: 2024-09-03 | End: 2024-09-06

## 2024-09-03 RX ORDER — ALPRAZOLAM 0.25 MG
.25-.5 TABLET ORAL ONCE
Status: DISCONTINUED | OUTPATIENT
Start: 2024-09-03 | End: 2024-09-03 | Stop reason: HOSPADM

## 2024-09-03 RX ORDER — SODIUM CHLORIDE, SODIUM LACTATE, POTASSIUM CHLORIDE, CALCIUM CHLORIDE 600; 310; 30; 20 MG/100ML; MG/100ML; MG/100ML; MG/100ML
INJECTION, SOLUTION INTRAVENOUS CONTINUOUS
Status: DISCONTINUED | OUTPATIENT
Start: 2024-09-03 | End: 2024-09-03 | Stop reason: HOSPADM

## 2024-09-03 RX ORDER — ACETAMINOPHEN 500 MG
1000 TABLET ORAL ONCE
Status: COMPLETED | OUTPATIENT
Start: 2024-09-03 | End: 2024-09-03

## 2024-09-03 RX ORDER — HYDROMORPHONE HYDROCHLORIDE 1 MG/ML
0.4 INJECTION, SOLUTION INTRAMUSCULAR; INTRAVENOUS; SUBCUTANEOUS
Status: DISCONTINUED | OUTPATIENT
Start: 2024-09-03 | End: 2024-09-03 | Stop reason: HOSPADM

## 2024-09-03 RX ADMIN — PROPOFOL 50 MCG/KG/MIN: 10 INJECTION, EMULSION INTRAVENOUS at 16:02

## 2024-09-03 RX ADMIN — FENTANYL CITRATE 50 MCG: 50 INJECTION, SOLUTION INTRAMUSCULAR; INTRAVENOUS at 17:11

## 2024-09-03 RX ADMIN — LIDOCAINE HYDROCHLORIDE 40 MG: 20 INJECTION, SOLUTION EPIDURAL; INFILTRATION; INTRACAUDAL at 15:57

## 2024-09-03 RX ADMIN — PROPOFOL 100 MG: 10 INJECTION, EMULSION INTRAVENOUS at 15:57

## 2024-09-03 RX ADMIN — SCOPOLAMINE 1 PATCH: 1.5 PATCH, EXTENDED RELEASE TRANSDERMAL at 14:40

## 2024-09-03 RX ADMIN — FENTANYL CITRATE 50 MCG: 50 INJECTION, SOLUTION INTRAMUSCULAR; INTRAVENOUS at 15:55

## 2024-09-03 RX ADMIN — FENTANYL CITRATE 50 MCG: 50 INJECTION, SOLUTION INTRAMUSCULAR; INTRAVENOUS at 16:28

## 2024-09-03 RX ADMIN — DEXAMETHASONE SODIUM PHOSPHATE 6 MG: 4 INJECTION INTRA-ARTICULAR; INTRALESIONAL; INTRAMUSCULAR; INTRAVENOUS; SOFT TISSUE at 16:00

## 2024-09-03 RX ADMIN — EPHEDRINE SULFATE 5 MG: 50 INJECTION, SOLUTION INTRAVENOUS at 16:05

## 2024-09-03 RX ADMIN — GLYCOPYRROLATE 0.2 MG: 0.2 INJECTION INTRAMUSCULAR; INTRAVENOUS at 16:08

## 2024-09-03 RX ADMIN — MIDAZOLAM HYDROCHLORIDE 2 MG: 2 INJECTION, SOLUTION INTRAMUSCULAR; INTRAVENOUS at 15:53

## 2024-09-03 RX ADMIN — EPHEDRINE SULFATE 5 MG: 50 INJECTION, SOLUTION INTRAVENOUS at 16:15

## 2024-09-03 RX ADMIN — ACETAMINOPHEN 1000 MG: 500 TABLET ORAL at 14:40

## 2024-09-03 RX ADMIN — FENTANYL CITRATE 50 MCG: 50 INJECTION, SOLUTION INTRAMUSCULAR; INTRAVENOUS at 16:43

## 2024-09-03 RX ADMIN — ONDANSETRON 4 MG: 2 INJECTION INTRAMUSCULAR; INTRAVENOUS at 16:54

## 2024-09-03 RX ADMIN — CEFAZOLIN 2 G: 1 INJECTION, POWDER, FOR SOLUTION INTRAMUSCULAR; INTRAVENOUS at 15:59

## 2024-09-03 RX ADMIN — SODIUM CHLORIDE, POTASSIUM CHLORIDE, SODIUM LACTATE AND CALCIUM CHLORIDE: 600; 310; 30; 20 INJECTION, SOLUTION INTRAVENOUS at 15:53

## 2024-09-03 RX ADMIN — FENTANYL CITRATE 50 MCG: 50 INJECTION, SOLUTION INTRAMUSCULAR; INTRAVENOUS at 16:37

## 2024-09-03 ASSESSMENT — PAIN DESCRIPTION - PAIN TYPE: TYPE: SURGICAL PAIN

## 2024-09-03 ASSESSMENT — FIBROSIS 4 INDEX: FIB4 SCORE: 1.42

## 2024-09-03 NOTE — ANESTHESIA POSTPROCEDURE EVALUATION
Patient: Rivka Kumar    Procedure Summary       Date: 09/03/24 Room / Location: [facility] ROOM 28 / SURGERY SAME DAY [facility]    Anesthesia Start: 1553 Anesthesia Stop:     Procedures:       WIRE LOCALIZED LEFT PARTIAL MASTECTOMY, POSSIBLE MASTOPEXY      MASTOPEXY Diagnosis: (LEFT BREAST HIGH GRADE DCIS)    Surgeons: Joyce Bourgeois M.D. Responsible Provider: Trupti Anthony M.D.    Anesthesia Type: general ASA Status: 2            Final Anesthesia Type: general  Last vitals  BP   Blood Pressure: (!) 148/68    Temp   36.1 °C (96.9 °F)    Pulse   61   Resp   16    SpO2   95 %      Anesthesia Post Evaluation    Patient location during evaluation: PACU  Patient participation: complete - patient participated  Level of consciousness: awake and alert    Airway patency: patent  Anesthetic complications: no  Cardiovascular status: hemodynamically stable  Respiratory status: acceptable  Hydration status: euvolemic    PONV: none          No notable events documented.     Nurse Pain Score: 0 (NPRS)

## 2024-09-03 NOTE — ANESTHESIA PREPROCEDURE EVALUATION
Case: [id number] Date/Time: 09/03/24 1512    Procedures:       WIRE LOCALIZED LEFT PARTIAL MASTECTOMY, POSSIBLE MASTOPEXY      MASTOPEXY    Pre-op diagnosis: LEFT BREAST HIGH GRADE DCIS    Location: [facility] ROOM 28 / SURGERY SAME DAY [facility]    Surgeons: Joyce Bourgeois M.D.          63 yo F with breast cancer. Very active.  EKG showing sinus bradycardia with a rate of 41. No current CP/SOB/N/V symptoms.     NPO  Relevant Problems   ANESTHESIA   (negative) History of anesthesia complications      Other   (positive) Ductal carcinoma in situ (DCIS) of left breast       Physical Exam    Airway   Mallampati: II  TM distance: >3 FB  Neck ROM: full       Cardiovascular - normal exam  Rhythm: regular  Rate: normal  (-) murmur     Dental - normal exam           Pulmonary - normal exam  Breath sounds clear to auscultation     Abdominal    Neurological - normal exam                   Anesthesia Plan    ASA 2       Plan - general       Airway plan will be LMA          Induction: intravenous    Postoperative Plan: Postoperative administration of opioids is intended.    Pertinent diagnostic labs and testing reviewed    Informed Consent:    Anesthetic plan and risks discussed with patient and spouse.    Use of blood products discussed with: patient and spouse whom consented to blood products.

## 2024-09-03 NOTE — ANESTHESIA PROCEDURE NOTES
Airway    Date/Time: 9/3/2024 3:58 PM    Performed by: Trupti Anthony M.D.  Authorized by: Trupti Anthony M.D.    Location:  OR  Urgency:  Elective  Difficult Airway: No    Indications for Airway Management:  Anesthesia      Spontaneous Ventilation: absent    Sedation Level:  Deep  Preoxygenated: Yes    Mask Difficulty Assessment:  1 - vent by mask  Final Airway Type:  Supraglottic airway  Final Supraglottic Airway:  Standard LMA    SGA Size:  3  Number of Attempts at Approach:  1

## 2024-09-04 ENCOUNTER — TELEPHONE (OUTPATIENT)
Dept: SURGERY | Facility: MEDICAL CENTER | Age: 64
End: 2024-09-04
Payer: COMMERCIAL

## 2024-09-04 LAB — PATHOLOGY CONSULT NOTE: NORMAL

## 2024-09-04 NOTE — TELEPHONE ENCOUNTER
I spoke with patient. She is sore but no pain medication. Slept well and is doing well. She expressed gratitude for all the good care and is aware to reach out if she has any concerns, otherwise we will plan to see her next week.

## 2024-09-04 NOTE — OP REPORT
Operative Report    Date: 9/3/2024      Pre-operative Diagnosis: DCIS left lower outer breast    Post-operative Diagnosis: Same    Procedure:   Left wire localized partial mastectomy  Inframammary crescent mastopexy      Surgeon: Joyce Bourgeois M.D.    Assistant: Elaina Goss PA-C    Anesthesiologist: Trupti Anthony MD    Anesthesia:  General.  Multimodal analgesics administered as appropriate.      Pre-Op Meds:  Ancef    ASA class:  2    Indications:   This is a 64 year old female with left breast DCIS, cTis cN0 M0.  There was a small hematoma present and we originally planned for this procedure to be a few weeks out.  Wire localization was planned as we were uncertain whether the hematoma would resolve in that interim.  However, we were able to reschedule her sooner than originally anticipated.  After discussing risks and benefits of her options, she wishes to proceed with breast conserving surgery.    Findings:   Wire loc mammogram demonstrated the wire tip extending 5.5cm medial to the targeted area.    Biopsy clip present at the anterior margin.  Intact wire tip. Calcifications noted to be present at the superior edge of the specimen.  Additional superior and posterior margin excised.  Single suture=Superior, Double suture=deep.  Pectoralis now exposed.  Clips zachery this margin.      Summary:   The patient underwent wire localization by Radiology.  She was taken to the operating room and anesthetized, then prepped and draped in sterile fashion.  Time out was confirmed.  Local anesthetic was injected prior to the incision.  On examination of the patient, the wire tip was noted to be visible superficially in the far medial, lower breast.      I then made an inframammary fold slightly curved incision followed by a second incision 1.5cm above this.  I de-epithelialized in between.  I then incised the superior aspect of the incision, leaving a small cuff of dermis, and dissected along the anterior mammary  fascial plan superiorly toward the mid-breast.  The area of interest in the lower outer breast was identified and I excised widely around this, including the wire.  The biopsy clip was noted to be extruding from the biopsy cavity at the anterior margin just below the skin.  Usual orienting sutures were placed and specimen mammogram was performed.  Calcifications were noted to be at the superior edge, so I elected to excised more tissue in this area.  I also included the attached posterior margin, exposing the pectoralis muscle.  I irrigated and ensured hemostasis.  Small clips were placed at the posterior margin edges.    I then incised the lateral 1/3 and medial 1/3 of the dermoglandular flap, and tied these together as per the Caitlin technique.  This was secured to the superior margin and lateral edges of the excision cavity with vicryl, to fill the defect.  The superior incision was advanced with adjacent vascularized skin to the inferior incision to decrease the breast envelope and minimize skin rippling.  3-0 vicryls were used for buried interrupted, and 4-0 monocryl was used for skin.  Dressings were placed and I was informed all counts were correct.  Assistance was required for retraction and help closing.

## 2024-09-04 NOTE — OR NURSING
1716: Pt arrived from OR to PACU 6. Connected to monitor. Report received from anesthesia & RN. VSS. Oxygen at 6L via mask. Breaths calm, even, and unlabored.  No signs of pain. Dressing to left chest CDI with binder.     1739: Updated spouse Matthew on patient status in recovery.     1752: Pt tolerating sips of water.     1808: Spouse brought back into recovery.     1820: Dr. Bourgeois bedside for updates.     1828: Discharge instructions reviewed with patient and family member. All questions answered, verbalizes understanding.     1832: Pt assisted into clothing.     1840: IV and ID bands removed. Pt then escorted to car via wheelchair, accompanied by RN and spouse. All personal belongings & discharge instructions with patient/family.

## 2024-09-04 NOTE — DISCHARGE INSTRUCTIONS
May shower, but no baths for at least four weeks.       - OK to remove clear bandage in 48 hours. No extra scrubbing at incision site. Pat dry.      - No lotions, powders, or creams at or near incision site.   Please wear sports/compression bra, then wear binder over sports bra for compression 24/7 except when showering. T-shirt may be worn between bra and binder to avoid skin irritation.    Walk frequently.    Pt has written post op instructions.       If any questions arise, call your provider.  If your provider is not available, please feel free to call the [facility] at [phone].    MEDICATIONS: Resume taking daily medication.  Take prescribed pain medication with food.  If no medication is prescribed, you may take non-aspirin pain medication if needed.  PAIN MEDICATION CAN BE VERY CONSTIPATING.  Take a stool softener or laxative such as senokot, pericolace, or milk of magnesia if needed.    Last pain medication given at   Tylenol was taken at 2:40 PM. OK to take again as soon as 8:40 PM.       You have a scopolamine patch in place that is helpful for nausea/vertigo.  This can stay in place for 3 days.  If you experience uncomfortable side effects (headache, severe dry mouth) you may take the patch off sooner.  Wash hands thoroughly with soap and water if the patch is touched with a bare hand and when you remove the patch.      What to Expect Post Anesthesia    Rest and take it easy for the first 24 hours.  A responsible adult is recommended to remain with you during that time.  It is normal to feel sleepy.  We encourage you to not do anything that requires balance, judgment or coordination.    FOR 24 HOURS DO NOT:  Drive, operate machinery or run household appliances.  Drink beer or alcoholic beverages.  Make important decisions or sign legal documents.    To avoid nausea, slowly advance diet as tolerated, avoiding spicy or greasy foods for the first day.  Add more substantial food to your diet  according to your provider's instructions.  Babies can be fed formula or breast milk as soon as they are hungry.  INCREASE FLUIDS AND FIBER TO AVOID CONSTIPATION.    MILD FLU-LIKE SYMPTOMS ARE NORMAL.  YOU MAY EXPERIENCE GENERALIZED MUSCLE ACHES, THROAT IRRITATION, HEADACHE AND/OR SOME NAUSEA.

## 2024-09-04 NOTE — ANESTHESIA TIME REPORT
Anesthesia Start and Stop Event Times       Date Time Event    9/3/2024 1538 Ready for Procedure     1553 Anesthesia Start     1717 Anesthesia Stop          Responsible Staff  09/03/24      Name Role Begin End    Trupti Anthony M.D. Anesth 1553 1717          Overtime Reason:  no overtime (within assigned shift)    Comments:

## 2024-09-05 ENCOUNTER — APPOINTMENT (OUTPATIENT)
Dept: ADMISSIONS | Facility: MEDICAL CENTER | Age: 64
End: 2024-09-05
Attending: SURGERY
Payer: COMMERCIAL

## 2024-09-10 ENCOUNTER — APPOINTMENT (OUTPATIENT)
Dept: RADIOLOGY | Facility: MEDICAL CENTER | Age: 64
End: 2024-09-10
Attending: SURGERY
Payer: COMMERCIAL

## 2024-09-11 ENCOUNTER — APPOINTMENT (OUTPATIENT)
Dept: SURGERY | Facility: MEDICAL CENTER | Age: 64
End: 2024-09-11
Payer: COMMERCIAL

## 2024-09-11 ENCOUNTER — OFFICE VISIT (OUTPATIENT)
Dept: SURGERY | Facility: MEDICAL CENTER | Age: 64
End: 2024-09-11
Payer: COMMERCIAL

## 2024-09-11 VITALS
DIASTOLIC BLOOD PRESSURE: 78 MMHG | HEART RATE: 52 BPM | OXYGEN SATURATION: 98 % | HEIGHT: 64 IN | BODY MASS INDEX: 21.72 KG/M2 | SYSTOLIC BLOOD PRESSURE: 147 MMHG | TEMPERATURE: 96.4 F | WEIGHT: 127.2 LBS

## 2024-09-11 DIAGNOSIS — D05.12 DUCTAL CARCINOMA IN SITU (DCIS) OF LEFT BREAST: ICD-10-CM

## 2024-09-11 PROCEDURE — 99024 POSTOP FOLLOW-UP VISIT: CPT | Performed by: SURGERY

## 2024-09-11 ASSESSMENT — FIBROSIS 4 INDEX: FIB4 SCORE: 1.42

## 2024-09-11 NOTE — PROGRESS NOTES
"    9/11/2024  5:54 AM    Primary care provider:  Meggan Isabel M.D. ([facility] [place])  Gynecologist:  Dr. Rae Durbin   Medical oncologist:  Dr. Reema Ocasio  Imaging facility:  [facility]    CC:   Chief Complaint   Patient presents with    Post-op        HPI: This very nice 64 y.o. female is scheduled for routine postoperative appointment.  Her  Matthew and daughter Molly are with her today.  She did very well after surgery.  She had no pain or nausea, and bowel habits are normal.  She is very appreciative of everyone's caring manner and attention.        No Known Allergies  Current Outpatient Medications   Medication Sig    cycloSPORINE (RESTASIS) 0.05 % ophthalmic emulsion Administer 1 Drop into both eyes 2 times a day.       Physical Exam:  BP (!) 147/78 (BP Location: Right arm, Patient Position: Sitting, BP Cuff Size: Large adult)   Pulse (!) 52   Temp (!) 35.8 °C (96.4 °F) (Temporal)   Ht 1.626 m (5' 4\")   Wt 57.7 kg (127 lb 3.2 oz)   SpO2 98%   BMI 21.83 kg/m²     General: Very pleasant demeanor.  Appears well, no acute distress.  Surgical site:  LEFT IMF incision healing well.  Expected inferior volume loss.  No infection or hematoma.   See scanned diagram    1. Ductal carcinoma in situ (DCIS) of left breast  Referral to Radiation Oncology          ASSESSMENT:  LEFT lateral (3:00) HG DCIS with necrosis and calcs. Pathologic Stage 0 (pTis cN0 M0).  Stereo biopsy 8/9/24.  LEFT wire localized partial mastectomy with inframammary crescent mastopexy 9/3/24.    2.4cm.  FINAL MARGINS CLEAR by at least 2mm.  Biopsy clip recovered.  (Approximately 2cm total area, pre-biopsy.  HM COIL present superficial to a small hematoma on bedside US.)   Grade 3.  Central, expansive comedonecrosis.                  ER %.  AK 11-20%.     Last bilateral mammogram 7/25/24 [facility]:  Heterogen dense.  Last prior mammo 6/22.     FH:  No known malignancy, though father's history is unknown.  Small panel ([facility]) negative " 24.  Large panel pending ([facility]).       Menopausal/HRT status:  .  Age of first delivery: 28   Menarche: 11   LMP: , no HRT      LIFESTYLE:  No smoking or vaping history.  7 alcohol per week.  She has one glass of red wine daily, encouraged by her  , in the hope of improving her health.  Discussed current data on alcohol and risk of breast cancer.  No drug history.  BMI 21.  Exercises regularly and stays very active.  Generally healthy and balanced approach to food.       ECOG 0= Fully active, able to carry on all pre-disease performance without restriction.     DISCUSSED/PLAN:  We discussed the pathology report in detail and printed copy was offered.  Appointment with Medical Oncology is pending and I anticipate discussion on estrogen blockers.  I reminded her of the Live Well, Be Well series as this month's topic is endocrine therapy side effect management.  We will also refer to Radiation Oncology to discuss risks and benefits of adjuvant radiation therapy.       She can resume normal activity as tolerated except for submerging the incision.  I advised her to walk regularly and continue wearing a compression bra.  We will see her back in about 1 month.  Virtual visit would be reasonable if needed, but would prefer to evaluate how she is healing in person if able.  .       REFERRALS:    Radiation Oncology

## 2024-09-16 ENCOUNTER — OFFICE VISIT (OUTPATIENT)
Dept: MEDICAL GROUP | Facility: PHYSICIAN GROUP | Age: 64
End: 2024-09-16
Payer: COMMERCIAL

## 2024-09-16 VITALS
HEIGHT: 64 IN | OXYGEN SATURATION: 97 % | HEART RATE: 54 BPM | TEMPERATURE: 97.2 F | SYSTOLIC BLOOD PRESSURE: 120 MMHG | DIASTOLIC BLOOD PRESSURE: 76 MMHG | BODY MASS INDEX: 21.85 KG/M2 | WEIGHT: 128 LBS

## 2024-09-16 DIAGNOSIS — E78.2 MIXED HYPERLIPIDEMIA: ICD-10-CM

## 2024-09-16 DIAGNOSIS — D05.12 DUCTAL CARCINOMA IN SITU (DCIS) OF LEFT BREAST: ICD-10-CM

## 2024-09-16 DIAGNOSIS — Z23 NEED FOR VACCINATION: ICD-10-CM

## 2024-09-16 PROCEDURE — 99213 OFFICE O/P EST LOW 20 MIN: CPT | Mod: 25 | Performed by: STUDENT IN AN ORGANIZED HEALTH CARE EDUCATION/TRAINING PROGRAM

## 2024-09-16 PROCEDURE — 90471 IMMUNIZATION ADMIN: CPT | Performed by: STUDENT IN AN ORGANIZED HEALTH CARE EDUCATION/TRAINING PROGRAM

## 2024-09-16 PROCEDURE — 90677 PCV20 VACCINE IM: CPT | Performed by: STUDENT IN AN ORGANIZED HEALTH CARE EDUCATION/TRAINING PROGRAM

## 2024-09-16 ASSESSMENT — ENCOUNTER SYMPTOMS
CHILLS: 0
SHORTNESS OF BREATH: 0
PALPITATIONS: 0
FEVER: 0

## 2024-09-16 ASSESSMENT — FIBROSIS 4 INDEX: FIB4 SCORE: 1.42

## 2024-09-16 NOTE — PROGRESS NOTES
Subjective:   Verbal consent was acquired by the patient to use [employer] ambient listening note generation during this visit Yes     CC: Follow-up after lumpectomy    History of Present Illness  Ms. Kumar is a pleasant 65 yo who presents today for follow-up visit.    She underwent a successful lumpectomy, which involved the removal of approximately half of her breast, specifically the lower part, while preserving the nipple. She did not experience any pain post-surgery and did not require pain medication. A follow-up appointment last week confirmed that she is recovering well. The surgical bandage is still in place and is expected to fall off naturally. No lymph nodes were removed during her surgery. Genetic testing confirmed that she does not carry the BRCA gene. She has an upcoming appointment with Dr. Daly a radiologist, on 10/08/2024. Her radiation therapy is scheduled to start on 10/08/2024, with sessions planned five days a week for four weeks. Post-radiation, she will likely start hormone-reducing medication. She has not yet consulted with the oncologist who will initiate the hormone therapy and has an appointment with Dr. Reema Ocasio next Monday. She was informed about the possibility of chemotherapy, but it was not recommended for her case. She was also advised about the potential side effects of hormone blockers.    She has been diagnosed with high cholesterol and is considering statin therapy. However, she prefers to manage her condition through diet and exercise, as she has not taken medications before. She maintains a regular exercise routine and does not consume much red meat.    She has an appointment with her gynecologist in 10/2024 for a Pap smear. She was advised to get tested for HIV due to her sexual activity, but she declined as she is only sexually active with her . She plans to receive the influenza vaccine at [facility] in 10/2024. She also intends to get the COVID-19 booster  "shot next month.    Health Maintenance: Completed  -Received pneumonia vaccine today    ROS:  Review of Systems   Constitutional:  Negative for chills and fever.   Respiratory:  Negative for shortness of breath.    Cardiovascular:  Negative for chest pain and palpitations.       Objective:     Exam:  /76 (BP Location: Left arm, Patient Position: Sitting, BP Cuff Size: Adult)   Pulse (!) 54   Temp 36.2 °C (97.2 °F) (Temporal)   Ht 1.626 m (5' 4\")   Wt 58.1 kg (128 lb)   SpO2 97%   BMI 21.97 kg/m²  Body mass index is 21.97 kg/m².    Physical Exam  Constitutional:       Appearance: Normal appearance.   Cardiovascular:      Rate and Rhythm: Normal rate and regular rhythm.      Heart sounds: Normal heart sounds.   Pulmonary:      Effort: Pulmonary effort is normal. No respiratory distress.      Breath sounds: Normal breath sounds. No stridor. No wheezing, rhonchi or rales.   Neurological:      Mental Status: She is alert.               Assessment & Plan:     64 y.o. female with the following -     1. Ductal carcinoma in situ (DCIS) of left breast  Patient is status post left partial mastectomy on 9/3/2024 by Dr. Joyce Bourgeois.  Patient has left lateral HGD CIS with necrosis and calcifications.  Pathologic stage 0 (pTis cN0 M0).  Patient is scheduled to see oncology on 9/23/2024 to discuss estrogen blocker medication.  She is also scheduled to see radiation oncology on 10/8/2024 to discuss adjuvant radiation therapy.  Patient reports that she is recovering well from surgery.    2. Mixed hyperlipidemia  Chronic, uncontrolled.  Lipid panel from 8/2024 with elevated total cholesterol and LDL.  Patient is not interested at statin medication at this time.  She would like to work on diet and exercise.  Discussed with patient on cutting down red meats and saturated fats.    3. Need for vaccination  - Pneumococcal Conjugate Vaccine 20-Valent (6 wks+)              Return if symptoms worsen or fail to improve.    Please " note that this dictation was created using voice recognition software. I have made every reasonable attempt to correct obvious errors, but I expect that there are errors of grammar and possibly content that I did not discover before finalizing the note.

## 2024-09-23 ENCOUNTER — PATIENT MESSAGE (OUTPATIENT)
Dept: HEMATOLOGY ONCOLOGY | Facility: MEDICAL CENTER | Age: 64
End: 2024-09-23

## 2024-09-23 ENCOUNTER — HOSPITAL ENCOUNTER (OUTPATIENT)
Dept: HEMATOLOGY ONCOLOGY | Facility: MEDICAL CENTER | Age: 64
End: 2024-09-23
Attending: STUDENT IN AN ORGANIZED HEALTH CARE EDUCATION/TRAINING PROGRAM
Payer: COMMERCIAL

## 2024-09-23 VITALS
SYSTOLIC BLOOD PRESSURE: 113 MMHG | WEIGHT: 127.89 LBS | BODY MASS INDEX: 21.83 KG/M2 | HEART RATE: 59 BPM | HEIGHT: 64 IN | RESPIRATION RATE: 15 BRPM | TEMPERATURE: 97.6 F | OXYGEN SATURATION: 97 % | DIASTOLIC BLOOD PRESSURE: 60 MMHG

## 2024-09-23 DIAGNOSIS — D05.12 DUCTAL CARCINOMA IN SITU (DCIS) OF LEFT BREAST: ICD-10-CM

## 2024-09-23 PROCEDURE — 99205 OFFICE O/P NEW HI 60 MIN: CPT | Performed by: STUDENT IN AN ORGANIZED HEALTH CARE EDUCATION/TRAINING PROGRAM

## 2024-09-23 PROCEDURE — 99212 OFFICE O/P EST SF 10 MIN: CPT | Performed by: STUDENT IN AN ORGANIZED HEALTH CARE EDUCATION/TRAINING PROGRAM

## 2024-09-23 PROCEDURE — RXMED WILLOW AMBULATORY MEDICATION CHARGE: Performed by: STUDENT IN AN ORGANIZED HEALTH CARE EDUCATION/TRAINING PROGRAM

## 2024-09-23 RX ORDER — ANASTROZOLE 1 MG/1
1 TABLET ORAL DAILY
Qty: 30 TABLET | Refills: 5 | Status: SHIPPED | OUTPATIENT
Start: 2024-09-23

## 2024-09-23 ASSESSMENT — PAIN SCALES - GENERAL: PAINLEVEL: NO PAIN

## 2024-09-23 ASSESSMENT — FIBROSIS 4 INDEX: FIB4 SCORE: 1.42

## 2024-09-23 NOTE — PROGRESS NOTES
Consult:  Hematology/Oncology    Surgical Oncologist: Joyce Bourgeois M.D.  Radiation Oncologist: Flip Daly M.D.  Primary Care:  Meggan Isabel M.D.  Gynecologist: Rae Durbin M.D.     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 female with no previous PMH who presents to establish care after lumpectomy following diagnosis on DCIS.     History is as follows:  Patient denies any symptoms and did not notice any abnormal areas on her breast prior to screening.  7/25/24 Screening Mammogram BIRADS-0 Left breast 3:00 position group of calcifications   8/2/24  Diagnostic Mammogram BIRADS-4 biopsy recommended  8/9/24  Stereotactic Biopsy - High grade DCIS ER+ % CO+ 11-20%   8/21/24 Established Surgical Oncology  9/3/24  Partial Mastectomy - G3 DCIS 2.4cm negative margins and separate minute focus of DCIS at superior and deep margin, margins negative   9/23/24 Established Medical Oncology     She is present today with her , Matthew and her daughter Molly as a referral from Dr. Bourgeois for new diagnosis of DCIS.  She was in her USOH until a routine mammogram screening 7/25/24 where she was noted to have calcifications at the 3:00 position of the left breast.  She denies feeling any masses or lesions prior, but after the procedure noted she did note a palpable area (which she attributed to 3 attempts to retrieve the biopsy).  She underwent diagnostic mammogram and subsequently stereotactic biopsy confirming DCIS.  She underwent partial mastectomy on 9/3/24 and tolerated the surgery well with no issues. Pathology confirmed DCIS measuring 2.4cm with negative margins and a separate minute focus of DCIS at superior and deep margin, but true margins negative by >2mm. She states she managed well with pain, no issues with ROM or UE edema currently.      Discussed plan for adjuvant endocrine therapy after  she completes radiation (she is scheduled to see Dr. Daly on 10/8).  She does note some concerns in regards to side effects from endocrine therapy - namely mood swings.  She reports being emotionally labile during menopause which has since improved.  Otherwise she states she is doing and feeling well.      Past Medical History:   Diagnosis Date    Cancer (HCC)     breast cancer     High cholesterol     Hyperlipidemia     Routine health maintenance 2013    BMI Mammogram 10/2012 Colonoscopy -- @ 51 to repeat in 5 years PAP smear  always nrmal Skin lesions to see the dermatologist Immunizations Influenza never Tetanus does not recall         Past Surgical History:   Procedure Laterality Date    PB MASTECTOMY, PARTIAL Left 9/3/2024    Procedure: WIRE LOCALIZED LEFT PARTIAL MASTECTOMY, MASTOPEXY;  Surgeon: Joyce Bourgeois M.D.;  Location: SURGERY SAME DAY [facility];  Service: General    AR SUSPENSION OF BREAST Left 9/3/2024    Procedure: MASTOPEXY;  Surgeon: Joyce Bourgeois M.D.;  Location: SURGERY SAME DAY [facility];  Service: General    TUBAL COAGULATION LAPAROSCOPIC BILATERAL         Family History   Problem Relation Age of Onset    Diabetes Mother     Hypertension Mother     Stroke Mother     Diabetes Brother     Hypertension Brother     Alcohol abuse Brother     Cancer Neg Hx     Breast Cancer Neg Hx     Colorectal Cancer Neg Hx      OB History    Para Term  AB Living   1 1 1     1   SAB IAB Ectopic Molar Multiple Live Births                    # Outcome Date GA Lbr Raimundo/2nd Weight Sex Type Anes PTL Lv   1 Term  40w0d    Vag-Spont         Obstetric Comments   Age of first delivery: 28   Menarche: 11   LMP: , no HRT     Allergies as of 2024    (No Known Allergies)       Current Outpatient Medications:     cycloSPORINE (RESTASIS) 0.05 % ophthalmic emulsion, Administer 1 Drop into both eyes 2 times a day., Disp: , Rfl:     Review of Systems:  ROS as above     Physical  "Exam:  Vitals:    09/23/24 1257   BP: 113/60   BP Location: Right arm   Patient Position: Sitting   BP Cuff Size: Adult   Pulse: (!) 59   Resp: 15   Temp: 36.4 °C (97.6 °F)   TempSrc: Temporal   SpO2: 97%   Weight: 58 kg (127 lb 14.2 oz)   Height: 1.626 m (5' 4.02\")     DESC; KARNOFSKY SCALE WITH ECOG EQUIVALENT: 100, Fully active, able to carry on all pre-disease performed without restriction (ECOG equivalent 0)    DISTRESS LEVEL: no apparent distress    Physical Exam  Constitutional:       General: She is not in acute distress.     Appearance: Normal appearance.   HENT:      Head: Normocephalic and atraumatic.      Mouth/Throat:      Mouth: Mucous membranes are moist.      Pharynx: Oropharynx is clear.   Eyes:      General: Scleral icterus present.   Cardiovascular:      Pulses: Normal pulses.   Pulmonary:      Effort: Pulmonary effort is normal. No respiratory distress.   Chest:      Comments: Incision at L inferior breast c/d/I. No masses or lesions palpated, no nipple discharge bilaterally.  No axillary adenopathy.   Musculoskeletal:      Cervical back: Tenderness present. No rigidity.   Lymphadenopathy:      Cervical: No cervical adenopathy.   Skin:     General: Skin is warm and dry.   Neurological:      Mental Status: She is alert and oriented to person, place, and time.        Labs:  Reviewed     Imaging:   Screening Mammogram 7/25/24  1.  Left breast 3:00 position group of calcifications. Additional imaging is recommended.  R0 -  CATEGORY 0: NEED ADDITIONAL IMAGING EVALUATION AND/OR PRIOR MAMMOGRAM FOR COMPARISON    Diagnostic Mammogram 8/2/24  Suspicious left breast calcifications. Recommend stereotactic core biopsy.  The patient met with the  to discuss scheduling the biopsy.  These results were given to the patient at the time of visit.  R4 - CATEGORY 4: SUSPICIOUS FINDING - (4C-FINDING NEEDING INTERVENTION WITH A MODERATE CONCERN BUT NOT CLASSIC FOR " "MALIGNANCY.)    Pathology:  Biopsy L Breast 3:00 8/9/24  A. Left breast, 3 o'clock calcifications, stereotactic biopsy:          High-grade ductal carcinoma in situ (DCIS) with necrosis and           calcifications.          Estrogen receptors: Positive, strong, %          Progesterone receptors: Positive, strong, 11-20%     Partial Mastectomy 9/3/24  A. Left partial mastectomy:          High-grade ductal carcinoma in situ (DCIS) with necrosis and           calcifications (see Synoptic report).          The area of DCIS measures approximately 24 mm based on presence           in 6 sections.          True final margins are all negative for DCIS (DCIS involves           initial superior margin and is less than 2 mm from initial deep           margin) (see comment).          Prior biopsy site and biopsy clip identified.     B. Superior and deep margin:          Minute focus of DCIS, margins negative for DCIS by greater than           2 mm.     DCIS OF THE BREAST: Resection     SPECIMEN    Procedure:  Excision (less than total mastectomy)     Specimen Laterality:  Left   TUMOR    Histologic Type:  Ductal carcinoma in situ     Size (Extent) of DCIS:  Estimated size (extent) of DCIS is at least     (Millimeters) - 24 mm (based on presence in 6 sections, A1, A3-A6,     B4)       Number of Blocks with DCIS:  6       Number of Blocks Examined:  19     Nuclear Grade:  Grade III (high)     Necrosis:  Present, central (expansive \"comedo\" necrosis)     Microcalcifications:  Present in DCIS, Present in nonneoplastic     tissue   MARGINS    Margin Status:  All margins negative for DCIS       Distance from DCIS to Closest Margin:  Greater than - 2 mm       Closest Margin(s) to DCIS:  Superior   REGIONAL LYMPH NODES     Regional Lymph Node Status:  Not applicable (no regional lymph nodes     submitted or found)   PATHOLOGIC STAGE CLASSIFICATION (pTNM, AJCC 8th Edition)   Reporting of pT, pN, and (when applicable) pM categories " is based on   information available to the pathologist at the time the report is   issued. As per the AJCC (Chapter 1, 8th Ed.) it is the managing   physician's responsibility to establish the final pathologic stage   based upon all pertinent information, including but potentially not   limited to this pathology report.     pT Category:  pTis (DCIS)     pN Category:  pN not assigned (no nodes submitted or found)   Breast Biomarker Testing Performed on Previous Biopsy:     Estrogen Receptor (ER) Status:  Positive       Percentage of Cells with Nuclear Positivity:  %     Progesterone Receptor (PgR) Status:  Positive       Percentage of Cells with Nuclear Positivity:  11-20%     Testing Performed on Case Number:  [id number] (8/9/2024)     Comment: In part A, DCIS focally involves the superior margin and is   less than 2 mm from the deep margin. The true superior and deep margins   in part B are negative for DCIS.     Assessment & Plan:  Ms. Kumar if a 65 yo post-menopausal F with recently diagnosed High Grade (G3) pTiscN0 Stage 0 DCIS of the L breast measuring 2.4cm s/p partial mastectomy on 9/3/24.  She is here today to establish care.     -She has an appointment with Radiation Oncology and discuss adjuvant radiation s/p partial mastectomy 10/8/24  -After she completes radiation, she will start endocrine therapy with anastrozole daily for total of 5 years.  Prescribed the medication for her to  but instructed her to hold on taking until she completes radiation treatment. Patient and daughter verbalized understanding.   -Will also need to get DEXA for baseline bone density, ordered today.  Instructed her to start taking an OTC VitD/Ca supplement and will plan for management of bone health while on AI with denosumab. Prior to initiating she will need to have a dental exam for clearance.  She is already established with a Dentist and states she will call.  Will address at next visit.   -Will have patient  follow up tentatively ~6 weeks after starting anastrozole (about 3 months from now) for toxicity check. Will plan to follow with H&P and breast exam every 6 months thereafter for 5 years with annual mammogram.      Any questions and concerns raised by the patient and family were answered to the best of my ability. Thank you for allowing me to participate in the care for this patient. Please feel free to contact me for any questions or concerns.     Total time spent on chart review, clinic encounter, and documentation: 60 minutes.

## 2024-09-24 ENCOUNTER — PHARMACY VISIT (OUTPATIENT)
Dept: PHARMACY | Facility: MEDICAL CENTER | Age: 64
End: 2024-09-24
Payer: MEDICARE

## 2024-09-30 RX ORDER — EPINEPHRINE 1 MG/ML(1)
0.5 AMPUL (ML) INJECTION PRN
OUTPATIENT
Start: 2024-09-30

## 2024-09-30 RX ORDER — DIPHENHYDRAMINE HYDROCHLORIDE 50 MG/ML
50 INJECTION INTRAMUSCULAR; INTRAVENOUS PRN
OUTPATIENT
Start: 2024-09-30

## 2024-09-30 RX ORDER — ALBUTEROL SULFATE 90 UG/1
2 INHALANT RESPIRATORY (INHALATION) PRN
OUTPATIENT
Start: 2024-09-30

## 2024-09-30 RX ORDER — METHYLPREDNISOLONE SODIUM SUCCINATE 125 MG/2ML
125 INJECTION, POWDER, LYOPHILIZED, FOR SOLUTION INTRAMUSCULAR; INTRAVENOUS PRN
OUTPATIENT
Start: 2024-09-30

## 2024-10-04 ENCOUNTER — HOSPITAL ENCOUNTER (OUTPATIENT)
Dept: RADIOLOGY | Facility: MEDICAL CENTER | Age: 64
End: 2024-10-04
Attending: STUDENT IN AN ORGANIZED HEALTH CARE EDUCATION/TRAINING PROGRAM
Payer: COMMERCIAL

## 2024-10-04 DIAGNOSIS — D05.12 DUCTAL CARCINOMA IN SITU (DCIS) OF LEFT BREAST: ICD-10-CM

## 2024-10-04 PROCEDURE — 77080 DXA BONE DENSITY AXIAL: CPT

## 2024-10-05 ASSESSMENT — LIFESTYLE VARIABLES
SMOKING_STATUS: NO
TOBACCO_USE: NO

## 2024-10-08 ENCOUNTER — HOSPITAL ENCOUNTER (OUTPATIENT)
Dept: RADIATION ONCOLOGY | Facility: MEDICAL CENTER | Age: 64
End: 2024-10-08
Attending: RADIOLOGY
Payer: COMMERCIAL

## 2024-10-08 VITALS
SYSTOLIC BLOOD PRESSURE: 122 MMHG | BODY MASS INDEX: 21.38 KG/M2 | HEART RATE: 68 BPM | HEIGHT: 64 IN | DIASTOLIC BLOOD PRESSURE: 69 MMHG | WEIGHT: 125.22 LBS | OXYGEN SATURATION: 96 %

## 2024-10-08 DIAGNOSIS — D05.12 DUCTAL CARCINOMA IN SITU (DCIS) OF LEFT BREAST: ICD-10-CM

## 2024-10-08 PROCEDURE — 99214 OFFICE O/P EST MOD 30 MIN: CPT | Performed by: RADIOLOGY

## 2024-10-08 ASSESSMENT — PAIN SCALES - GENERAL: PAINLEVEL: NO PAIN

## 2024-10-08 ASSESSMENT — FIBROSIS 4 INDEX: FIB4 SCORE: 1.42

## 2024-10-09 ENCOUNTER — HOSPITAL ENCOUNTER (OUTPATIENT)
Dept: RADIATION ONCOLOGY | Facility: MEDICAL CENTER | Age: 64
End: 2024-10-09

## 2024-10-09 ENCOUNTER — TELEPHONE (OUTPATIENT)
Dept: RADIATION ONCOLOGY | Facility: MEDICAL CENTER | Age: 64
End: 2024-10-09
Payer: COMMERCIAL

## 2024-10-09 PROCEDURE — 77334 RADIATION TREATMENT AID(S): CPT | Mod: 26 | Performed by: RADIOLOGY

## 2024-10-09 PROCEDURE — 77263 THER RADIOLOGY TX PLNG CPLX: CPT | Performed by: RADIOLOGY

## 2024-10-09 PROCEDURE — 77334 RADIATION TREATMENT AID(S): CPT | Performed by: RADIOLOGY

## 2024-10-09 PROCEDURE — 77290 THER RAD SIMULAJ FIELD CPLX: CPT | Mod: 26 | Performed by: RADIOLOGY

## 2024-10-09 PROCEDURE — 77290 THER RAD SIMULAJ FIELD CPLX: CPT | Performed by: RADIOLOGY

## 2024-10-15 PROCEDURE — 77334 RADIATION TREATMENT AID(S): CPT | Performed by: RADIOLOGY

## 2024-10-15 PROCEDURE — 77300 RADIATION THERAPY DOSE PLAN: CPT | Performed by: RADIOLOGY

## 2024-10-15 PROCEDURE — 77334 RADIATION TREATMENT AID(S): CPT | Mod: 26 | Performed by: RADIOLOGY

## 2024-10-15 PROCEDURE — 77300 RADIATION THERAPY DOSE PLAN: CPT | Mod: 26 | Performed by: RADIOLOGY

## 2024-10-15 PROCEDURE — 77295 3-D RADIOTHERAPY PLAN: CPT | Performed by: RADIOLOGY

## 2024-10-15 PROCEDURE — 77295 3-D RADIOTHERAPY PLAN: CPT | Mod: 26 | Performed by: RADIOLOGY

## 2024-10-16 ENCOUNTER — OFFICE VISIT (OUTPATIENT)
Dept: SURGERY | Facility: MEDICAL CENTER | Age: 64
End: 2024-10-16
Payer: COMMERCIAL

## 2024-10-16 ENCOUNTER — HOSPITAL ENCOUNTER (OUTPATIENT)
Dept: RADIATION ONCOLOGY | Facility: MEDICAL CENTER | Age: 64
End: 2024-10-16
Attending: RADIOLOGY
Payer: COMMERCIAL

## 2024-10-16 ENCOUNTER — HOSPITAL ENCOUNTER (OUTPATIENT)
Dept: RADIATION ONCOLOGY | Facility: MEDICAL CENTER | Age: 64
End: 2024-10-16

## 2024-10-16 VITALS
SYSTOLIC BLOOD PRESSURE: 147 MMHG | DIASTOLIC BLOOD PRESSURE: 83 MMHG | OXYGEN SATURATION: 97 % | HEART RATE: 59 BPM | WEIGHT: 129.19 LBS | BODY MASS INDEX: 22.18 KG/M2

## 2024-10-16 VITALS
BODY MASS INDEX: 21.68 KG/M2 | TEMPERATURE: 96.5 F | DIASTOLIC BLOOD PRESSURE: 81 MMHG | HEART RATE: 61 BPM | OXYGEN SATURATION: 97 % | WEIGHT: 127 LBS | HEIGHT: 64 IN | SYSTOLIC BLOOD PRESSURE: 141 MMHG

## 2024-10-16 DIAGNOSIS — D05.12 DUCTAL CARCINOMA IN SITU (DCIS) OF LEFT BREAST: ICD-10-CM

## 2024-10-16 LAB
CHEMOTHERAPY INFUSION START DATE: NORMAL
CHEMOTHERAPY RECORDS: 2.67 GY
CHEMOTHERAPY RECORDS: 4005 CGY
CHEMOTHERAPY RECORDS: NORMAL
CHEMOTHERAPY RX CANCER: NORMAL
DATE 1ST CHEMO CANCER: NORMAL
RAD ONC ARIA COURSE LAST TREATMENT DATE: NORMAL
RAD ONC ARIA COURSE TREATMENT ELAPSED DAYS: NORMAL
RAD ONC ARIA REFERENCE POINT DOSAGE GIVEN TO DATE: 2.67 GY
RAD ONC ARIA REFERENCE POINT ID: NORMAL
RAD ONC ARIA REFERENCE POINT SESSION DOSAGE GIVEN: 2.67 GY

## 2024-10-16 PROCEDURE — 77412 RADIATION TX DELIVERY LVL 3: CPT | Performed by: RADIOLOGY

## 2024-10-16 PROCEDURE — 77280 THER RAD SIMULAJ FIELD SMPL: CPT | Mod: 26 | Performed by: RADIOLOGY

## 2024-10-16 PROCEDURE — 77280 THER RAD SIMULAJ FIELD SMPL: CPT | Performed by: RADIOLOGY

## 2024-10-16 PROCEDURE — 99024 POSTOP FOLLOW-UP VISIT: CPT

## 2024-10-16 PROCEDURE — 77417 THER RADIOLOGY PORT IMAGE(S): CPT | Performed by: RADIOLOGY

## 2024-10-16 ASSESSMENT — FIBROSIS 4 INDEX
FIB4 SCORE: 1.42
FIB4 SCORE: 1.42

## 2024-10-16 ASSESSMENT — PAIN SCALES - GENERAL: PAINLEVEL: NO PAIN

## 2024-10-17 ENCOUNTER — HOSPITAL ENCOUNTER (OUTPATIENT)
Dept: RADIATION ONCOLOGY | Facility: MEDICAL CENTER | Age: 64
End: 2024-10-17
Payer: COMMERCIAL

## 2024-10-17 LAB
CHEMOTHERAPY INFUSION START DATE: NORMAL
CHEMOTHERAPY RECORDS: 2.67 GY
CHEMOTHERAPY RECORDS: 4005 CGY
CHEMOTHERAPY RECORDS: NORMAL
CHEMOTHERAPY RX CANCER: NORMAL
DATE 1ST CHEMO CANCER: NORMAL
RAD ONC ARIA COURSE LAST TREATMENT DATE: NORMAL
RAD ONC ARIA COURSE TREATMENT ELAPSED DAYS: NORMAL
RAD ONC ARIA REFERENCE POINT DOSAGE GIVEN TO DATE: 5.34 GY
RAD ONC ARIA REFERENCE POINT ID: NORMAL
RAD ONC ARIA REFERENCE POINT SESSION DOSAGE GIVEN: 2.67 GY

## 2024-10-17 PROCEDURE — 77412 RADIATION TX DELIVERY LVL 3: CPT | Performed by: RADIOLOGY

## 2024-10-17 PROCEDURE — 77417 THER RADIOLOGY PORT IMAGE(S): CPT | Performed by: RADIOLOGY

## 2024-10-18 ENCOUNTER — HOSPITAL ENCOUNTER (OUTPATIENT)
Dept: RADIATION ONCOLOGY | Facility: MEDICAL CENTER | Age: 64
End: 2024-10-18
Payer: COMMERCIAL

## 2024-10-18 LAB
CHEMOTHERAPY INFUSION START DATE: NORMAL
CHEMOTHERAPY RECORDS: 2.67 GY
CHEMOTHERAPY RECORDS: 4005 CGY
CHEMOTHERAPY RECORDS: NORMAL
CHEMOTHERAPY RX CANCER: NORMAL
DATE 1ST CHEMO CANCER: NORMAL
RAD ONC ARIA COURSE LAST TREATMENT DATE: NORMAL
RAD ONC ARIA COURSE TREATMENT ELAPSED DAYS: NORMAL
RAD ONC ARIA REFERENCE POINT DOSAGE GIVEN TO DATE: 8.01 GY
RAD ONC ARIA REFERENCE POINT ID: NORMAL
RAD ONC ARIA REFERENCE POINT SESSION DOSAGE GIVEN: 2.67 GY

## 2024-10-18 PROCEDURE — 77336 RADIATION PHYSICS CONSULT: CPT | Performed by: RADIOLOGY

## 2024-10-18 PROCEDURE — 77412 RADIATION TX DELIVERY LVL 3: CPT | Performed by: RADIOLOGY

## 2024-10-18 PROCEDURE — 77417 THER RADIOLOGY PORT IMAGE(S): CPT | Performed by: RADIOLOGY

## 2024-10-21 ENCOUNTER — HOSPITAL ENCOUNTER (OUTPATIENT)
Dept: RADIATION ONCOLOGY | Facility: MEDICAL CENTER | Age: 64
End: 2024-10-21
Payer: COMMERCIAL

## 2024-10-21 LAB
CHEMOTHERAPY INFUSION START DATE: NORMAL
CHEMOTHERAPY RECORDS: 2.67 GY
CHEMOTHERAPY RECORDS: 4005 CGY
CHEMOTHERAPY RECORDS: NORMAL
CHEMOTHERAPY RX CANCER: NORMAL
DATE 1ST CHEMO CANCER: NORMAL
RAD ONC ARIA COURSE LAST TREATMENT DATE: NORMAL
RAD ONC ARIA COURSE TREATMENT ELAPSED DAYS: NORMAL
RAD ONC ARIA REFERENCE POINT DOSAGE GIVEN TO DATE: 10.68 GY
RAD ONC ARIA REFERENCE POINT ID: NORMAL
RAD ONC ARIA REFERENCE POINT SESSION DOSAGE GIVEN: 2.67 GY

## 2024-10-21 PROCEDURE — 77412 RADIATION TX DELIVERY LVL 3: CPT | Performed by: RADIOLOGY

## 2024-10-22 ENCOUNTER — HOSPITAL ENCOUNTER (OUTPATIENT)
Dept: RADIATION ONCOLOGY | Facility: MEDICAL CENTER | Age: 64
End: 2024-10-22
Payer: COMMERCIAL

## 2024-10-22 LAB
CHEMOTHERAPY INFUSION START DATE: NORMAL
CHEMOTHERAPY RECORDS: 2.67 GY
CHEMOTHERAPY RECORDS: 4005 CGY
CHEMOTHERAPY RECORDS: NORMAL
CHEMOTHERAPY RX CANCER: NORMAL
DATE 1ST CHEMO CANCER: NORMAL
RAD ONC ARIA COURSE LAST TREATMENT DATE: NORMAL
RAD ONC ARIA COURSE TREATMENT ELAPSED DAYS: NORMAL
RAD ONC ARIA REFERENCE POINT DOSAGE GIVEN TO DATE: 13.35 GY
RAD ONC ARIA REFERENCE POINT ID: NORMAL
RAD ONC ARIA REFERENCE POINT SESSION DOSAGE GIVEN: 2.67 GY

## 2024-10-22 PROCEDURE — 77412 RADIATION TX DELIVERY LVL 3: CPT | Performed by: RADIOLOGY

## 2024-10-22 PROCEDURE — 77427 RADIATION TX MANAGEMENT X5: CPT | Performed by: RADIOLOGY

## 2024-10-23 ENCOUNTER — HOSPITAL ENCOUNTER (OUTPATIENT)
Dept: RADIATION ONCOLOGY | Facility: MEDICAL CENTER | Age: 64
End: 2024-10-23
Attending: RADIOLOGY
Payer: COMMERCIAL

## 2024-10-23 ENCOUNTER — HOSPITAL ENCOUNTER (OUTPATIENT)
Dept: RADIATION ONCOLOGY | Facility: MEDICAL CENTER | Age: 64
End: 2024-10-23

## 2024-10-23 ENCOUNTER — HOSPITAL ENCOUNTER (OUTPATIENT)
Facility: MEDICAL CENTER | Age: 64
End: 2024-10-23
Attending: OBSTETRICS & GYNECOLOGY
Payer: COMMERCIAL

## 2024-10-23 ENCOUNTER — OFFICE VISIT (OUTPATIENT)
Dept: OBGYN | Facility: CLINIC | Age: 64
End: 2024-10-23
Payer: COMMERCIAL

## 2024-10-23 ENCOUNTER — TELEPHONE (OUTPATIENT)
Dept: OBGYN | Facility: CLINIC | Age: 64
End: 2024-10-23

## 2024-10-23 VITALS
BODY MASS INDEX: 21.91 KG/M2 | SYSTOLIC BLOOD PRESSURE: 144 MMHG | DIASTOLIC BLOOD PRESSURE: 77 MMHG | HEART RATE: 54 BPM | OXYGEN SATURATION: 97 % | WEIGHT: 127.65 LBS

## 2024-10-23 VITALS — DIASTOLIC BLOOD PRESSURE: 76 MMHG | SYSTOLIC BLOOD PRESSURE: 123 MMHG | WEIGHT: 128 LBS | BODY MASS INDEX: 21.97 KG/M2

## 2024-10-23 DIAGNOSIS — R30.0 DYSURIA: ICD-10-CM

## 2024-10-23 DIAGNOSIS — N30.01 ACUTE CYSTITIS WITH HEMATURIA: ICD-10-CM

## 2024-10-23 DIAGNOSIS — Z01.419 WELL WOMAN EXAM WITH ROUTINE GYNECOLOGICAL EXAM: ICD-10-CM

## 2024-10-23 DIAGNOSIS — Z12.4 SCREENING FOR CERVICAL CANCER: ICD-10-CM

## 2024-10-23 LAB
APPEARANCE UR: CLEAR
BILIRUB UR STRIP-MCNC: NORMAL MG/DL
CHEMOTHERAPY INFUSION START DATE: NORMAL
CHEMOTHERAPY RECORDS: 2.67 GY
CHEMOTHERAPY RECORDS: 4005 CGY
CHEMOTHERAPY RECORDS: NORMAL
CHEMOTHERAPY RX CANCER: NORMAL
COLOR UR AUTO: YELLOW
DATE 1ST CHEMO CANCER: NORMAL
GLUCOSE UR STRIP.AUTO-MCNC: NORMAL MG/DL
KETONES UR STRIP.AUTO-MCNC: NORMAL MG/DL
LEUKOCYTE ESTERASE UR QL STRIP.AUTO: NORMAL
NITRITE UR QL STRIP.AUTO: POSITIVE
PH UR STRIP.AUTO: 5.5 [PH] (ref 5–8)
PROT UR QL STRIP: NORMAL MG/DL
RAD ONC ARIA COURSE LAST TREATMENT DATE: NORMAL
RAD ONC ARIA COURSE TREATMENT ELAPSED DAYS: NORMAL
RAD ONC ARIA REFERENCE POINT DOSAGE GIVEN TO DATE: 16.02 GY
RAD ONC ARIA REFERENCE POINT ID: NORMAL
RAD ONC ARIA REFERENCE POINT SESSION DOSAGE GIVEN: 2.67 GY
RBC UR QL AUTO: NORMAL
SP GR UR STRIP.AUTO: 1.02
UROBILINOGEN UR STRIP-MCNC: 0.2 MG/DL

## 2024-10-23 PROCEDURE — 87186 SC STD MICRODIL/AGAR DIL: CPT

## 2024-10-23 PROCEDURE — 77417 THER RADIOLOGY PORT IMAGE(S): CPT | Performed by: RADIOLOGY

## 2024-10-23 PROCEDURE — 87086 URINE CULTURE/COLONY COUNT: CPT

## 2024-10-23 PROCEDURE — 87077 CULTURE AEROBIC IDENTIFY: CPT

## 2024-10-23 PROCEDURE — 99396 PREV VISIT EST AGE 40-64: CPT | Performed by: OBSTETRICS & GYNECOLOGY

## 2024-10-23 PROCEDURE — 88142 CYTOPATH C/V THIN LAYER: CPT

## 2024-10-23 PROCEDURE — 81002 URINALYSIS NONAUTO W/O SCOPE: CPT | Performed by: OBSTETRICS & GYNECOLOGY

## 2024-10-23 PROCEDURE — 77412 RADIATION TX DELIVERY LVL 3: CPT | Performed by: RADIOLOGY

## 2024-10-23 RX ORDER — SULFAMETHOXAZOLE AND TRIMETHOPRIM 800; 160 MG/1; MG/1
1 TABLET ORAL 2 TIMES DAILY
Qty: 10 TABLET | Refills: 0 | Status: SHIPPED | OUTPATIENT
Start: 2024-10-23 | End: 2024-10-28

## 2024-10-23 ASSESSMENT — PAIN SCALES - GENERAL: PAINLEVEL: NO PAIN

## 2024-10-23 ASSESSMENT — FIBROSIS 4 INDEX
FIB4 SCORE: 1.42
FIB4 SCORE: 1.42

## 2024-10-24 ENCOUNTER — HOSPITAL ENCOUNTER (OUTPATIENT)
Dept: RADIATION ONCOLOGY | Facility: MEDICAL CENTER | Age: 64
End: 2024-10-24
Payer: COMMERCIAL

## 2024-10-24 LAB
CHEMOTHERAPY INFUSION START DATE: NORMAL
CHEMOTHERAPY RECORDS: 2.67 GY
CHEMOTHERAPY RECORDS: 4005 CGY
CHEMOTHERAPY RECORDS: NORMAL
CHEMOTHERAPY RX CANCER: NORMAL
DATE 1ST CHEMO CANCER: NORMAL
RAD ONC ARIA COURSE LAST TREATMENT DATE: NORMAL
RAD ONC ARIA COURSE TREATMENT ELAPSED DAYS: NORMAL
RAD ONC ARIA REFERENCE POINT DOSAGE GIVEN TO DATE: 18.69 GY
RAD ONC ARIA REFERENCE POINT ID: NORMAL
RAD ONC ARIA REFERENCE POINT SESSION DOSAGE GIVEN: 2.67 GY

## 2024-10-24 PROCEDURE — 77417 THER RADIOLOGY PORT IMAGE(S): CPT | Performed by: RADIOLOGY

## 2024-10-24 PROCEDURE — 77412 RADIATION TX DELIVERY LVL 3: CPT | Performed by: RADIOLOGY

## 2024-10-28 ENCOUNTER — HOSPITAL ENCOUNTER (OUTPATIENT)
Dept: RADIATION ONCOLOGY | Facility: MEDICAL CENTER | Age: 64
End: 2024-10-28

## 2024-10-28 LAB
CHEMOTHERAPY INFUSION START DATE: NORMAL
CHEMOTHERAPY RECORDS: 2.67 GY
CHEMOTHERAPY RECORDS: 4005 CGY
CHEMOTHERAPY RECORDS: NORMAL
CHEMOTHERAPY RX CANCER: NORMAL
DATE 1ST CHEMO CANCER: NORMAL
RAD ONC ARIA COURSE LAST TREATMENT DATE: NORMAL
RAD ONC ARIA COURSE TREATMENT ELAPSED DAYS: NORMAL
RAD ONC ARIA REFERENCE POINT DOSAGE GIVEN TO DATE: 21.36 GY
RAD ONC ARIA REFERENCE POINT ID: NORMAL
RAD ONC ARIA REFERENCE POINT SESSION DOSAGE GIVEN: 2.67 GY

## 2024-10-28 PROCEDURE — 77412 RADIATION TX DELIVERY LVL 3: CPT | Performed by: RADIOLOGY

## 2024-10-29 ENCOUNTER — HOSPITAL ENCOUNTER (OUTPATIENT)
Dept: RADIATION ONCOLOGY | Facility: MEDICAL CENTER | Age: 64
End: 2024-10-29

## 2024-10-29 LAB
CHEMOTHERAPY INFUSION START DATE: NORMAL
CHEMOTHERAPY RECORDS: 2.67 GY
CHEMOTHERAPY RECORDS: 4005 CGY
CHEMOTHERAPY RECORDS: NORMAL
CHEMOTHERAPY RX CANCER: NORMAL
DATE 1ST CHEMO CANCER: NORMAL
RAD ONC ARIA COURSE LAST TREATMENT DATE: NORMAL
RAD ONC ARIA COURSE TREATMENT ELAPSED DAYS: NORMAL
RAD ONC ARIA REFERENCE POINT DOSAGE GIVEN TO DATE: 24.03 GY
RAD ONC ARIA REFERENCE POINT ID: NORMAL
RAD ONC ARIA REFERENCE POINT SESSION DOSAGE GIVEN: 2.67 GY

## 2024-10-29 PROCEDURE — 77412 RADIATION TX DELIVERY LVL 3: CPT | Performed by: RADIOLOGY

## 2024-10-30 ENCOUNTER — HOSPITAL ENCOUNTER (OUTPATIENT)
Dept: RADIATION ONCOLOGY | Facility: MEDICAL CENTER | Age: 64
End: 2024-10-30
Attending: RADIOLOGY
Payer: COMMERCIAL

## 2024-10-30 ENCOUNTER — HOSPITAL ENCOUNTER (OUTPATIENT)
Dept: RADIATION ONCOLOGY | Facility: MEDICAL CENTER | Age: 64
End: 2024-10-30

## 2024-10-30 VITALS
SYSTOLIC BLOOD PRESSURE: 123 MMHG | OXYGEN SATURATION: 98 % | DIASTOLIC BLOOD PRESSURE: 74 MMHG | HEART RATE: 55 BPM | WEIGHT: 129.19 LBS | BODY MASS INDEX: 22.18 KG/M2

## 2024-10-30 LAB
CHEMOTHERAPY INFUSION START DATE: NORMAL
CHEMOTHERAPY RECORDS: 2.67 GY
CHEMOTHERAPY RECORDS: 4005 CGY
CHEMOTHERAPY RECORDS: NORMAL
CHEMOTHERAPY RX CANCER: NORMAL
DATE 1ST CHEMO CANCER: NORMAL
RAD ONC ARIA COURSE LAST TREATMENT DATE: NORMAL
RAD ONC ARIA COURSE TREATMENT ELAPSED DAYS: NORMAL
RAD ONC ARIA REFERENCE POINT DOSAGE GIVEN TO DATE: 26.7 GY
RAD ONC ARIA REFERENCE POINT ID: NORMAL
RAD ONC ARIA REFERENCE POINT SESSION DOSAGE GIVEN: 2.67 GY

## 2024-10-30 PROCEDURE — 77412 RADIATION TX DELIVERY LVL 3: CPT | Performed by: RADIOLOGY

## 2024-10-30 ASSESSMENT — FIBROSIS 4 INDEX: FIB4 SCORE: 1.42

## 2024-10-30 ASSESSMENT — PAIN SCALES - GENERAL: PAINLEVEL: NO PAIN

## 2024-10-31 ENCOUNTER — HOSPITAL ENCOUNTER (OUTPATIENT)
Dept: RADIATION ONCOLOGY | Facility: MEDICAL CENTER | Age: 64
End: 2024-10-31
Payer: COMMERCIAL

## 2024-10-31 LAB
CHEMOTHERAPY INFUSION START DATE: NORMAL
CHEMOTHERAPY RECORDS: 2.67 GY
CHEMOTHERAPY RECORDS: 4005 CGY
CHEMOTHERAPY RECORDS: NORMAL
CHEMOTHERAPY RX CANCER: NORMAL
DATE 1ST CHEMO CANCER: NORMAL
RAD ONC ARIA COURSE LAST TREATMENT DATE: NORMAL
RAD ONC ARIA COURSE TREATMENT ELAPSED DAYS: NORMAL
RAD ONC ARIA REFERENCE POINT DOSAGE GIVEN TO DATE: 29.37 GY
RAD ONC ARIA REFERENCE POINT ID: NORMAL
RAD ONC ARIA REFERENCE POINT SESSION DOSAGE GIVEN: 2.67 GY

## 2024-10-31 PROCEDURE — 77412 RADIATION TX DELIVERY LVL 3: CPT | Performed by: RADIOLOGY

## 2024-10-31 PROCEDURE — 77417 THER RADIOLOGY PORT IMAGE(S): CPT | Performed by: RADIOLOGY

## 2024-11-01 ENCOUNTER — HOSPITAL ENCOUNTER (OUTPATIENT)
Dept: RADIATION ONCOLOGY | Facility: MEDICAL CENTER | Age: 64
End: 2024-11-01
Payer: COMMERCIAL

## 2024-11-01 ENCOUNTER — HOSPITAL ENCOUNTER (OUTPATIENT)
Dept: RADIATION ONCOLOGY | Facility: MEDICAL CENTER | Age: 64
End: 2024-11-01
Attending: RADIOLOGY
Payer: COMMERCIAL

## 2024-11-01 LAB
CHEMOTHERAPY INFUSION START DATE: NORMAL
CHEMOTHERAPY RECORDS: 2.67 GY
CHEMOTHERAPY RECORDS: 4005 CGY
CHEMOTHERAPY RECORDS: NORMAL
CHEMOTHERAPY RX CANCER: NORMAL
DATE 1ST CHEMO CANCER: NORMAL
HPV I/H RISK 1 DNA SPEC QL NAA+PROBE: NOT DETECTED
RAD ONC ARIA COURSE LAST TREATMENT DATE: NORMAL
RAD ONC ARIA COURSE TREATMENT ELAPSED DAYS: NORMAL
RAD ONC ARIA REFERENCE POINT DOSAGE GIVEN TO DATE: 32.04 GY
RAD ONC ARIA REFERENCE POINT ID: NORMAL
RAD ONC ARIA REFERENCE POINT SESSION DOSAGE GIVEN: 2.67 GY
SPECIMEN SOURCE: NORMAL
THINPREP PAP, CYTOLOGY NL11781: NORMAL

## 2024-11-01 PROCEDURE — 77412 RADIATION TX DELIVERY LVL 3: CPT | Performed by: RADIOLOGY

## 2024-11-04 ENCOUNTER — HOSPITAL ENCOUNTER (OUTPATIENT)
Dept: RADIATION ONCOLOGY | Facility: MEDICAL CENTER | Age: 64
End: 2024-11-04
Payer: COMMERCIAL

## 2024-11-04 ENCOUNTER — PATIENT OUTREACH (OUTPATIENT)
Dept: ONCOLOGY | Facility: MEDICAL CENTER | Age: 64
End: 2024-11-04
Payer: COMMERCIAL

## 2024-11-04 LAB
CHEMOTHERAPY INFUSION START DATE: NORMAL
CHEMOTHERAPY RECORDS: 2.67 GY
CHEMOTHERAPY RECORDS: 4005 CGY
CHEMOTHERAPY RECORDS: NORMAL
CHEMOTHERAPY RX CANCER: NORMAL
DATE 1ST CHEMO CANCER: NORMAL
RAD ONC ARIA COURSE LAST TREATMENT DATE: NORMAL
RAD ONC ARIA COURSE TREATMENT ELAPSED DAYS: NORMAL
RAD ONC ARIA REFERENCE POINT DOSAGE GIVEN TO DATE: 34.71 GY
RAD ONC ARIA REFERENCE POINT ID: NORMAL
RAD ONC ARIA REFERENCE POINT SESSION DOSAGE GIVEN: 2.67 GY

## 2024-11-04 PROCEDURE — 77336 RADIATION PHYSICS CONSULT: CPT | Performed by: RADIOLOGY

## 2024-11-04 PROCEDURE — 77412 RADIATION TX DELIVERY LVL 3: CPT | Performed by: RADIOLOGY

## 2024-11-05 ENCOUNTER — HOSPITAL ENCOUNTER (OUTPATIENT)
Dept: RADIATION ONCOLOGY | Facility: MEDICAL CENTER | Age: 64
End: 2024-11-05

## 2024-11-05 ENCOUNTER — APPOINTMENT (OUTPATIENT)
Dept: MEDICAL GROUP | Facility: PHYSICIAN GROUP | Age: 64
End: 2024-11-05
Payer: COMMERCIAL

## 2024-11-05 LAB
CHEMOTHERAPY INFUSION START DATE: NORMAL
CHEMOTHERAPY RECORDS: 2.67 GY
CHEMOTHERAPY RECORDS: 4005 CGY
CHEMOTHERAPY RECORDS: NORMAL
CHEMOTHERAPY RX CANCER: NORMAL
DATE 1ST CHEMO CANCER: NORMAL
RAD ONC ARIA COURSE LAST TREATMENT DATE: NORMAL
RAD ONC ARIA COURSE TREATMENT ELAPSED DAYS: NORMAL
RAD ONC ARIA REFERENCE POINT DOSAGE GIVEN TO DATE: 37.38 GY
RAD ONC ARIA REFERENCE POINT ID: NORMAL
RAD ONC ARIA REFERENCE POINT SESSION DOSAGE GIVEN: 2.67 GY

## 2024-11-05 PROCEDURE — 77412 RADIATION TX DELIVERY LVL 3: CPT | Performed by: RADIOLOGY

## 2024-11-06 ENCOUNTER — HOSPITAL ENCOUNTER (OUTPATIENT)
Dept: RADIATION ONCOLOGY | Facility: MEDICAL CENTER | Age: 64
End: 2024-11-06
Attending: RADIOLOGY
Payer: COMMERCIAL

## 2024-11-06 ENCOUNTER — HOSPITAL ENCOUNTER (OUTPATIENT)
Dept: RADIATION ONCOLOGY | Facility: MEDICAL CENTER | Age: 64
End: 2024-11-06

## 2024-11-06 VITALS
WEIGHT: 131.61 LBS | BODY MASS INDEX: 22.59 KG/M2 | SYSTOLIC BLOOD PRESSURE: 125 MMHG | HEART RATE: 58 BPM | DIASTOLIC BLOOD PRESSURE: 73 MMHG | OXYGEN SATURATION: 99 %

## 2024-11-06 LAB
CHEMOTHERAPY INFUSION START DATE: NORMAL
CHEMOTHERAPY RECORDS: 2.67 GY
CHEMOTHERAPY RECORDS: 4005 CGY
CHEMOTHERAPY RECORDS: NORMAL
CHEMOTHERAPY RX CANCER: NORMAL
DATE 1ST CHEMO CANCER: NORMAL
RAD ONC ARIA COURSE LAST TREATMENT DATE: NORMAL
RAD ONC ARIA COURSE TREATMENT ELAPSED DAYS: NORMAL
RAD ONC ARIA REFERENCE POINT DOSAGE GIVEN TO DATE: 40.05 GY
RAD ONC ARIA REFERENCE POINT ID: NORMAL
RAD ONC ARIA REFERENCE POINT SESSION DOSAGE GIVEN: 2.67 GY

## 2024-11-06 PROCEDURE — 77412 RADIATION TX DELIVERY LVL 3: CPT | Performed by: RADIOLOGY

## 2024-11-06 PROCEDURE — 77427 RADIATION TX MANAGEMENT X5: CPT | Performed by: RADIOLOGY

## 2024-11-06 ASSESSMENT — FIBROSIS 4 INDEX: FIB4 SCORE: 1.42

## 2024-11-06 ASSESSMENT — PAIN SCALES - GENERAL: PAINLEVEL_OUTOF10: NO PAIN

## 2024-11-06 NOTE — ON TREATMENT VISIT
ON TREATMENT NOTE  RADIATION ONCOLOGY DEPARTMENT    Patient name:  Rivka Kumar    Primary Physician:  Meggan Isabel M.D. MRN: [id number]  CSN: [id number]   Referring physician:  Joyce Bourgeois M.D. : 1960, 64 y.o.     ENCOUNTER DATE:  24    DIAGNOSIS:    Ductal carcinoma in situ (DCIS) of left breast  Staging form: Breast, AJCC 8th Edition  - Clinical stage from 2024: Stage 0 (cTis (DCIS), cN0, cM0, G3, ER+, CA+) - Signed by Reema Ocasio D.O. on 2024  Histologic grading system: 3 grade system      TREATMENT SUMMARY:  Aria Treatment Information          2024   Aria Course Treatment Dates   Course First Treatment Date 10/16/2024    Course Last Treatment Date 2024    Aria Treatment Summary   Lt Breast FB  Plan from Course C1_LBreast   Fraction 15 of 15   Elapsed Course Days  @ 2024   Prescribed Fraction Dose 267 cGy   Prescribed Total Dose 4,005 cGy   Lt Breast FB  Reference Point from Course C1_LBreast   Elapsed Course Days  @ 2024   Session Dose 267 cGy   Total Dose 4,005 cGy      Radiation Treatments       Active   Plans   Lt Breast FB   Most recent treatment: Dose planned: 267 cGy (fraction 15 of 15 on 2024)   Total: Dose planned: 4,005 cGy   Elapsed Days:  @ 2024           Historical   No historical radiation treatments to show.               SUBJECTIVE:   Patient is doing well.  She has slight hyperpigmentation in the inframammary fold and nipple areolar complex area.    VITAL SIGNS:    Encounter Vitals  Blood Pressure: 125/73  Pulse: (!) 58  Pulse Oximetry: 99 %  Weight: 59.7 kg (131 lb 9.8 oz)  Weight Source: Stand Up Scale  Pain Score: No pain      2024     9:49 AM 10/30/2024     9:57 AM 10/23/2024    11:57 AM 10/16/2024     1:44 PM 10/8/2024    12:49 PM 2024    12:57 PM   Pain Assessment   Pain Score NO PAIN NO PAIN NO PAIN NO PAIN NO PAIN NO PAIN          PHYSICAL EXAM:  Mild erythema in the treatment field    TOXICITY       11/6/2024     9:50 AM 10/30/2024     9:58 AM 10/23/2024    11:59 AM 10/16/2024     1:45 PM   Toxicity Assessment   Toxicity Assessment Breast Breast Breast Breast   Fatigue (lethargy, malaise, asthenia) None None None None   Fever (in the absence of neutropenia) None None None None   Radiation Dermatitis Faint erythema or dry desquamation None None None   Lymphatics Mild lymphedema Normal Normal Normal   RT - Pain due to RT Mild pain not interfering with function None None None   Dyspnea Normal Normal Normal Normal         IMPRESSION:  Cancer Staging   Ductal carcinoma in situ (DCIS) of left breast  Staging form: Breast, AJCC 8th Edition  - Clinical stage from 8/9/2024: Stage 0 (cTis (DCIS), cN0, cM0, G3, ER+, ND+) - Signed by Reema Ocasio D.O. on 9/24/2024      PLAN:  No change in treatment plan    Disposition:  Treatment plan reviewed. Questions answered. Continue therapy outlined.     Flip Daly M.D.    No orders of the defined types were placed in this encounter.

## 2024-11-07 ENCOUNTER — HOSPITAL ENCOUNTER (OUTPATIENT)
Dept: RADIATION ONCOLOGY | Facility: MEDICAL CENTER | Age: 64
End: 2024-11-07
Payer: COMMERCIAL

## 2024-11-07 LAB
CHEMOTHERAPY INFUSION START DATE: NORMAL
CHEMOTHERAPY RECORDS: 1000 CGY
CHEMOTHERAPY RECORDS: 2 GY
CHEMOTHERAPY RECORDS: NORMAL
CHEMOTHERAPY RX CANCER: NORMAL
DATE 1ST CHEMO CANCER: NORMAL
RAD ONC ARIA COURSE LAST TREATMENT DATE: NORMAL
RAD ONC ARIA COURSE TREATMENT ELAPSED DAYS: NORMAL
RAD ONC ARIA REFERENCE POINT DOSAGE GIVEN TO DATE: 2 GY
RAD ONC ARIA REFERENCE POINT ID: NORMAL
RAD ONC ARIA REFERENCE POINT SESSION DOSAGE GIVEN: 2 GY

## 2024-11-07 PROCEDURE — 77014 PR CT GUIDANCE PLACEMENT RAD THERAPY FIELDS: CPT | Mod: 26 | Performed by: RADIOLOGY

## 2024-11-07 PROCEDURE — 77417 THER RADIOLOGY PORT IMAGE(S): CPT | Performed by: RADIOLOGY

## 2024-11-07 PROCEDURE — 77387 GUIDANCE FOR RADJ TX DLVR: CPT | Performed by: RADIOLOGY

## 2024-11-07 PROCEDURE — 77412 RADIATION TX DELIVERY LVL 3: CPT | Performed by: RADIOLOGY

## 2024-11-08 ENCOUNTER — HOSPITAL ENCOUNTER (OUTPATIENT)
Dept: RADIATION ONCOLOGY | Facility: MEDICAL CENTER | Age: 64
End: 2024-11-08
Payer: COMMERCIAL

## 2024-11-08 LAB
CHEMOTHERAPY INFUSION START DATE: NORMAL
CHEMOTHERAPY RECORDS: 1000 CGY
CHEMOTHERAPY RECORDS: 2 GY
CHEMOTHERAPY RECORDS: NORMAL
CHEMOTHERAPY RX CANCER: NORMAL
DATE 1ST CHEMO CANCER: NORMAL
RAD ONC ARIA COURSE LAST TREATMENT DATE: NORMAL
RAD ONC ARIA COURSE TREATMENT ELAPSED DAYS: NORMAL
RAD ONC ARIA REFERENCE POINT DOSAGE GIVEN TO DATE: 4 GY
RAD ONC ARIA REFERENCE POINT ID: NORMAL
RAD ONC ARIA REFERENCE POINT SESSION DOSAGE GIVEN: 2 GY

## 2024-11-08 PROCEDURE — 77387 GUIDANCE FOR RADJ TX DLVR: CPT | Performed by: RADIOLOGY

## 2024-11-08 PROCEDURE — 77014 PR CT GUIDANCE PLACEMENT RAD THERAPY FIELDS: CPT | Mod: 26 | Performed by: RADIOLOGY

## 2024-11-08 PROCEDURE — 77412 RADIATION TX DELIVERY LVL 3: CPT | Performed by: RADIOLOGY

## 2024-11-11 ENCOUNTER — HOSPITAL ENCOUNTER (OUTPATIENT)
Dept: RADIATION ONCOLOGY | Facility: MEDICAL CENTER | Age: 64
End: 2024-11-11
Payer: COMMERCIAL

## 2024-11-11 LAB
CHEMOTHERAPY INFUSION START DATE: NORMAL
CHEMOTHERAPY RECORDS: 1000 CGY
CHEMOTHERAPY RECORDS: 2 GY
CHEMOTHERAPY RECORDS: NORMAL
CHEMOTHERAPY RX CANCER: NORMAL
DATE 1ST CHEMO CANCER: NORMAL
RAD ONC ARIA COURSE LAST TREATMENT DATE: NORMAL
RAD ONC ARIA COURSE TREATMENT ELAPSED DAYS: NORMAL
RAD ONC ARIA REFERENCE POINT DOSAGE GIVEN TO DATE: 6 GY
RAD ONC ARIA REFERENCE POINT ID: NORMAL
RAD ONC ARIA REFERENCE POINT SESSION DOSAGE GIVEN: 2 GY

## 2024-11-11 PROCEDURE — 77387 GUIDANCE FOR RADJ TX DLVR: CPT | Performed by: RADIOLOGY

## 2024-11-11 PROCEDURE — 77014 PR CT GUIDANCE PLACEMENT RAD THERAPY FIELDS: CPT | Mod: 26 | Performed by: RADIOLOGY

## 2024-11-11 PROCEDURE — 77412 RADIATION TX DELIVERY LVL 3: CPT | Performed by: RADIOLOGY

## 2024-11-11 PROCEDURE — 77336 RADIATION PHYSICS CONSULT: CPT | Performed by: RADIOLOGY

## 2024-11-12 ENCOUNTER — HOSPITAL ENCOUNTER (OUTPATIENT)
Dept: RADIATION ONCOLOGY | Facility: MEDICAL CENTER | Age: 64
End: 2024-11-12
Payer: COMMERCIAL

## 2024-11-12 LAB
CHEMOTHERAPY INFUSION START DATE: NORMAL
CHEMOTHERAPY RECORDS: 1000 CGY
CHEMOTHERAPY RECORDS: 2 GY
CHEMOTHERAPY RECORDS: NORMAL
CHEMOTHERAPY RX CANCER: NORMAL
DATE 1ST CHEMO CANCER: NORMAL
RAD ONC ARIA COURSE LAST TREATMENT DATE: NORMAL
RAD ONC ARIA COURSE TREATMENT ELAPSED DAYS: NORMAL
RAD ONC ARIA REFERENCE POINT DOSAGE GIVEN TO DATE: 8 GY
RAD ONC ARIA REFERENCE POINT ID: NORMAL
RAD ONC ARIA REFERENCE POINT SESSION DOSAGE GIVEN: 2 GY

## 2024-11-12 PROCEDURE — 77412 RADIATION TX DELIVERY LVL 3: CPT | Performed by: RADIOLOGY

## 2024-11-12 PROCEDURE — 77014 PR CT GUIDANCE PLACEMENT RAD THERAPY FIELDS: CPT | Mod: 26 | Performed by: RADIOLOGY

## 2024-11-12 PROCEDURE — 77387 GUIDANCE FOR RADJ TX DLVR: CPT | Performed by: RADIOLOGY

## 2024-11-13 ENCOUNTER — HOSPITAL ENCOUNTER (OUTPATIENT)
Dept: RADIATION ONCOLOGY | Facility: MEDICAL CENTER | Age: 64
End: 2024-11-13

## 2024-11-13 ENCOUNTER — HOSPITAL ENCOUNTER (OUTPATIENT)
Dept: RADIATION ONCOLOGY | Facility: MEDICAL CENTER | Age: 64
End: 2024-11-13
Attending: RADIOLOGY
Payer: COMMERCIAL

## 2024-11-13 VITALS
BODY MASS INDEX: 22.52 KG/M2 | WEIGHT: 131.17 LBS | OXYGEN SATURATION: 100 % | HEART RATE: 52 BPM | SYSTOLIC BLOOD PRESSURE: 137 MMHG | DIASTOLIC BLOOD PRESSURE: 62 MMHG

## 2024-11-13 DIAGNOSIS — D05.12 DUCTAL CARCINOMA IN SITU (DCIS) OF LEFT BREAST: ICD-10-CM

## 2024-11-13 LAB
CHEMOTHERAPY INFUSION START DATE: NORMAL
CHEMOTHERAPY INFUSION START DATE: NORMAL
CHEMOTHERAPY INFUSION STOP DATE: NORMAL
CHEMOTHERAPY RECORDS: 1000 CGY
CHEMOTHERAPY RECORDS: 1000 CGY
CHEMOTHERAPY RECORDS: 2 GY
CHEMOTHERAPY RECORDS: 2 GY
CHEMOTHERAPY RECORDS: 2.67 GY
CHEMOTHERAPY RECORDS: 4005 CGY
CHEMOTHERAPY RECORDS: NORMAL
CHEMOTHERAPY RX CANCER: NORMAL
CHEMOTHERAPY RX CANCER: NORMAL
DATE 1ST CHEMO CANCER: NORMAL
DATE 1ST CHEMO CANCER: NORMAL
RAD ONC ARIA COURSE LAST TREATMENT DATE: NORMAL
RAD ONC ARIA COURSE LAST TREATMENT DATE: NORMAL
RAD ONC ARIA COURSE TREATMENT ELAPSED DAYS: NORMAL
RAD ONC ARIA COURSE TREATMENT ELAPSED DAYS: NORMAL
RAD ONC ARIA REFERENCE POINT DOSAGE GIVEN TO DATE: 10 GY
RAD ONC ARIA REFERENCE POINT DOSAGE GIVEN TO DATE: 10 GY
RAD ONC ARIA REFERENCE POINT DOSAGE GIVEN TO DATE: 40.05 GY
RAD ONC ARIA REFERENCE POINT ID: NORMAL
RAD ONC ARIA REFERENCE POINT SESSION DOSAGE GIVEN: 2 GY

## 2024-11-13 PROCEDURE — 77387 GUIDANCE FOR RADJ TX DLVR: CPT | Performed by: RADIOLOGY

## 2024-11-13 PROCEDURE — 77014 PR CT GUIDANCE PLACEMENT RAD THERAPY FIELDS: CPT | Mod: 26 | Performed by: RADIOLOGY

## 2024-11-13 PROCEDURE — 77412 RADIATION TX DELIVERY LVL 3: CPT | Performed by: RADIOLOGY

## 2024-11-13 PROCEDURE — 77427 RADIATION TX MANAGEMENT X5: CPT | Performed by: RADIOLOGY

## 2024-11-13 ASSESSMENT — FIBROSIS 4 INDEX: FIB4 SCORE: 1.42

## 2024-11-13 ASSESSMENT — PAIN SCALES - GENERAL: PAINLEVEL_OUTOF10: NO PAIN

## 2024-11-13 NOTE — ON TREATMENT VISIT
ON TREATMENT NOTE  RADIATION ONCOLOGY DEPARTMENT    Patient name:  Rivka Kumar    Primary Physician:  Meggan Isabel M.D. MRN: [id number]  CSN: [id number]   Referring physician:  Joyce Bourgeois M.D. : 1960, 64 y.o.     ENCOUNTER DATE:  24    DIAGNOSIS:    Ductal carcinoma in situ (DCIS) of left breast  Staging form: Breast, AJCC 8th Edition  - Clinical stage from 2024: Stage 0 (cTis (DCIS), cN0, cM0, G3, ER+, PA+) - Signed by Reema Oacsio D.O. on 2024  Histologic grading system: 3 grade system      TREATMENT SUMMARY:  Aria Treatment Information          2024   Aria Course Treatment Dates   Course First Treatment Date 10/16/2024    Course Last Treatment Date 2024    Aria Treatment Summary   Lt Breast Bst  Plan from Course C1_LBreast   Fraction 5 of 5   Elapsed Course Days  @ 2024   Prescribed Fraction Dose 200 cGy   Prescribed Total Dose 1,000 cGy   Lt Breast Bst  Reference Point from Course C1_LBreast   Elapsed Course Days  @ 2024   Session Dose 200 cGy   Total Dose 1,000 cGy      Radiation Treatments       Active   Plans   Lt Breast FB   Most recent treatment: Dose planned: 267 cGy (fraction 15 of 15 on 2024)   Total: Dose planned: 4,005 cGy   Elapsed Days: 21 @ 2024      Lt Breast Bst   Most recent treatment: Dose planned: 200 cGy (fraction 5 of 5 on 2024)   Total: Dose planned: 1,000 cGy   Elapsed Days: 28 @ 2024           Historical   No historical radiation treatments to show.               SUBJECTIVE:   Patient is doing well.  She has mild to moderate erythema in the radiation treatment area slightly more notable in the mid axillary line and nipple areolar complex.    VITAL SIGNS:    Encounter Vitals  Blood Pressure: 137/62  Pulse: (!) 52  Pulse Oximetry: 100 %  Weight: 59.5 kg (131 lb 2.8 oz)  Weight Source: Stand Up Scale  Pain Score: No pain      2024     9:58 AM 2024     9:49 AM 10/30/2024     9:57 AM  10/23/2024    11:57 AM 10/16/2024     1:44 PM 10/8/2024    12:49 PM   Pain Assessment   Pain Score NO PAIN NO PAIN NO PAIN NO PAIN NO PAIN NO PAIN          PHYSICAL EXAM:  Moderate erythema in the treatment field    TOXICITY      11/13/2024    10:00 AM 11/6/2024     9:50 AM 10/30/2024     9:58 AM 10/23/2024    11:59 AM 10/16/2024     1:45 PM   Toxicity Assessment   Toxicity Assessment Breast Breast Breast Breast Breast   Fatigue (lethargy, malaise, asthenia) None None None None None   Fever (in the absence of neutropenia) None None None None None   Radiation Dermatitis Faint erythema or dry desquamation Faint erythema or dry desquamation None None None   Lymphatics Normal Mild lymphedema Normal Normal Normal   RT - Pain due to RT None Mild pain not interfering with function None None None   Dyspnea Normal Normal Normal Normal Normal         IMPRESSION:  Cancer Staging   Ductal carcinoma in situ (DCIS) of left breast  Staging form: Breast, AJCC 8th Edition  - Clinical stage from 8/9/2024: Stage 0 (cTis (DCIS), cN0, cM0, G3, ER+, [place]+) - Signed by Reema Ocasio D.O. on 9/24/2024      PLAN:  No change in treatment plan  She would like a referral to the oncology wellness program    Disposition:  Treatment plan reviewed. Questions answered. Continue therapy outlined.     Flip Daly M.D.    Orders Placed This Encounter    Referral to Breast Surgery Oncology

## 2024-11-13 NOTE — RADIATION COMPLETION NOTES
END OF TREATMENT SUMMARY    Patient name:  Rivka Kumar    Primary Physician:  Meggan Isabel M.D. MRN: [id number]  CSN: [id number]   Referring physician:  Dr. Bk PERERAB: 1960, 64 y.o.       TREATMENT SUMMARY:        Course First Treatment Date 10/16/2024    Course Last Treatment Date 2024   Course Elapsed Days 28 @ 2024   Course Intent Curative     Radiation Therapy Episodes       Active Episodes       Radiation Therapy: 3D CRT                   Radiation Treatments         Plan Last Treated On Elapsed Days Fractions Treated Prescribed Fraction Dose (cGy) Prescribed Total Dose (cGy)    Lt Breast 2024 28 @ 2024 15 of 15 267 4,005    Lt Breast Bst 2024 @ 2024 5 of 5 200 1,000                  Reference Point Last Treated On Elapsed Days Most Recent Session Dose (cGy) Total Dose (cGy)    Lt Breast Bst 2024 28 @ 2024 -- 1,000    Lt Breast FB 2024 28 @ 2024 -- 4,005                                     STAGE:   Ductal carcinoma in situ (DCIS) of left breast  Staging form: Breast, AJCC 8th Edition  - Clinical stage from 2024: Stage 0 (cTis (DCIS), cN0, cM0, G3, ER+, [place]+) - Signed by Reema Ocasio D.O. on 2024  Histologic grading system: 3 grade system       TREATMENT INDICATION:   Breast conservation therapy     CONCURRENT SYSTEMIC TREATMENT:   None     RT COURSE DISCONTINUED EARLY:   No     PATIENT EXPERIENCE:       2024    10:00 AM 2024     9:50 AM 10/30/2024     9:58 AM 10/23/2024    11:59 AM 10/16/2024     1:45 PM   Toxicity Assessment   Toxicity Assessment Breast Breast Breast Breast Breast   Fatigue (lethargy, malaise, asthenia) None None None None None   Fever (in the absence of neutropenia) None None None None None   Radiation Dermatitis Faint erythema or dry desquamation Faint erythema or dry desquamation None None None   Lymphatics Normal Mild lymphedema Normal Normal Normal   RT - Pain due to RT None Mild pain not  interfering with function None None None   Dyspnea Normal Normal Normal Normal Normal        FOLLOW-UP PLAN:   6 Weeks     COMMENT:          ANATOMIC TARGET SUMMARY    ANATOMIC TARGET MODALITY TECHNIQUE   Left breast   External beam, photons 3D Conformal            COMMENT:         DIAGRAMS:      DOSE VOLUME HISTOGRAMS:

## 2024-11-18 ENCOUNTER — TELEPHONE (OUTPATIENT)
Dept: SURGERY | Facility: MEDICAL CENTER | Age: 64
End: 2024-11-18
Payer: COMMERCIAL

## 2024-11-20 ENCOUNTER — OFFICE VISIT (OUTPATIENT)
Dept: SURGERY | Facility: MEDICAL CENTER | Age: 64
End: 2024-11-20
Payer: COMMERCIAL

## 2024-11-20 VITALS
SYSTOLIC BLOOD PRESSURE: 122 MMHG | HEART RATE: 55 BPM | HEIGHT: 64 IN | DIASTOLIC BLOOD PRESSURE: 60 MMHG | WEIGHT: 131 LBS | BODY MASS INDEX: 22.36 KG/M2 | TEMPERATURE: 97.1 F

## 2024-11-20 DIAGNOSIS — N95.8 GENITOURINARY SYNDROME OF MENOPAUSE: ICD-10-CM

## 2024-11-20 DIAGNOSIS — D05.12 DUCTAL CARCINOMA IN SITU (DCIS) OF LEFT BREAST: ICD-10-CM

## 2024-11-20 PROCEDURE — 99215 OFFICE O/P EST HI 40 MIN: CPT | Mod: 24 | Performed by: OBSTETRICS & GYNECOLOGY

## 2024-11-20 PROCEDURE — 99417 PROLNG OP E/M EACH 15 MIN: CPT | Performed by: OBSTETRICS & GYNECOLOGY

## 2024-11-20 PROCEDURE — 3078F DIAST BP <80 MM HG: CPT | Performed by: OBSTETRICS & GYNECOLOGY

## 2024-11-20 PROCEDURE — 3074F SYST BP LT 130 MM HG: CPT | Performed by: OBSTETRICS & GYNECOLOGY

## 2024-11-20 ASSESSMENT — FIBROSIS 4 INDEX: FIB4 SCORE: 1.42

## 2024-11-20 NOTE — PROGRESS NOTES
Primary Care Provider Meggan Isabel M.D.   Referring Provider: Dr Daly  Surgical Oncologist: Dr Bourgeois  Medical Oncologist: Dr Ocasio  Radiation Oncologist: Dr Daly     HPI:  64 y.o. yo    Patient referred for wellness / lifestyle medicine care after a diagnosis of L breast DCIS (2024)   Partial mastectomy 2024  Anastrazole - per Dr Ocasio after XRT - hasn't it started yet  Whole breast XRT - Dr Daly - finished last week  DEXA - osteopenia  Hyperlipidemia     This very delightful patient presents today referred by Dr. Flip Daly to discuss sexual health and intimacy concerns as well as her sleep after her cancer treatment.  She went through menopause approximately 13 years ago states menopausal symptoms were in general starting to taper off as far as the vasomotor symptoms although has noticed kind of gradually increasing sexual health concerns.  She is particularly worried because she is getting ready to start anastrozole and is worried that things are going to worsen.  She has seen her gynecologist Dr. Angelica Durbin who noted she had some atrophy.  The patient does complain of dryness and some irritation she denies any pain with intercourse states that she does use lubricants regularly but her other complaint is just a generalized decrease in libido.  When asked about bladder symptoms she did state over the past few months she has had 2 recent urinary tract infections she does get up about 3 times a night to urinate and has some frequency.    The nocturia adds to her difficulty with sleep but she has issues falling asleep and staying asleep she has lots of thoughts that she ruminates on during the night she is planning on trying to incorporate yoga into her exercise routine to see if she gets some improvement.  This is a more recent issue.    Patient states her nutrition overall is very good although she does drink a glass of wine every evening.  She states that her   is [place] and this is simply part of their routine and his culture.  She does enjoy it.  She understands the recommendation is to decrease this but she is not sure in her end of her interest.    Regarding exercise the patient works out at [employer] she does for 60-minute workouts a week with TRX, weights, cardio and is adding in yoga.          Cancer Staging   Ductal carcinoma in situ (DCIS) of left breast  Staging form: Breast, AJCC 8th Edition  - Clinical stage from 8/9/2024: Stage 0 (cTis (DCIS), cN0, cM0, G3, ER+, DC+) - Signed by Reema Ocasio D.O. on 9/24/2024         Patient Active Problem List   Diagnosis    Ductal carcinoma in situ (DCIS) of left breast        Past Surgical History:   Procedure Laterality Date    PB MASTECTOMY, PARTIAL Left 9/3/2024    Procedure: WIRE LOCALIZED LEFT PARTIAL MASTECTOMY, MASTOPEXY;  Surgeon: Joyce Bourgeois M.D.;  Location: SURGERY SAME DAY [facility];  Service: General    DC SUSPENSION OF BREAST Left 9/3/2024    Procedure: MASTOPEXY;  Surgeon: Joyce Bourgeois M.D.;  Location: SURGERY SAME DAY [facility];  Service: General    TUBAL COAGULATION LAPAROSCOPIC BILATERAL  1999        Social History     Tobacco Use    Smoking status: Never    Smokeless tobacco: Never   Vaping Use    Vaping status: Never Used   Substance Use Topics    Alcohol use: Yes     Alcohol/week: 4.2 oz     Types: 7 Glasses of wine per week     Comment: wine with dinner    Drug use: Never        Family History   Problem Relation Age of Onset    Diabetes Mother     Hypertension Mother     Stroke Mother     Diabetes Brother     Hypertension Brother     Alcohol abuse Brother     Cancer Neg Hx     Breast Cancer Neg Hx     Colorectal Cancer Neg Hx         Rivka Kumar has No Known Allergies.     Rivka Kumar has a current medication list which includes the following prescription(s): anastrozole and cyclosporine.     Physical Exam  Vitals reviewed.   Constitutional:       Appearance: Normal  appearance.   Skin:     General: Skin is warm.   Neurological:      General: No focal deficit present.      Mental Status: She is alert and oriented to person, place, and time.   Psychiatric:         Mood and Affect: Mood normal.         Behavior: Behavior normal.         Thought Content: Thought content normal.          There are no diagnoses linked to this encounter.     We reviewed the goal of the oncology wellness clinic is to assist her in optimizing health, sense of well-being, reducing recurrence risk, and improving quality of life after a cancer diagnosis. We identified the following areas which are currently identified as priorities to work on by this patient:     optimizing sleep and energy levels and sexual health and intimacy    Our current plan includes:   We discussed a wide variety of lifestyle factors and the importance they play in cancer survivorship, not only to optimize short and long term health, but to reduce the risk of recurrence, and improve quality of life in all stages of the disease.  and Discussed the ultimate goal being 150 - 300 min a week of moderate intensity movement AND 2 strength training workouts / week. But it is important for patients to start where they are and slowly increase to these goals     We had a discussion on sleep and I provided her with a variety of sleep hygiene suggestions from the lifestyle modifications standpoint.  We also discussed the role acupuncture can play with sleep as well as her alcohol intake and the role that it could be playing with interfering with sleep.  I would suggest she for started off with decreasing the alcohol from both carcinogenic standpoint as well as a sleep standpoint.  Then we could consider something like acupuncture if something was still needed.  She hopes to avoid any further addition of medications.    Sexual Health Plan:   We had a nice discussion regarding common sexual health concerns after menopause as estrogen levels  declined.  Frequently there is a combination of genitourinary syndrome of menopause type symptoms which she is experiencing with vaginal dryness and urinary symptoms and nocturia.  Combined with frequently decrease in libido which can occur with menopause but also can be a component of sexual health after her cancer diagnosis.  There can also be arousal challenges as well.  I discussed the below as well as some other nonhormonal allergies such as laser procedures.  We will start with conservative management with the below  Discussed vaginal moisturizers. their regular use, and variety of brands. Samples given. and Discussed the importance of liberal use of lubricants with sexual activity and acts of intimacy, reviewed water, oil, and silicone based options. Samples given.     I do not have the patient's schedule a specific follow-up appointment although she is very aware that I am available on an as-needed basis for the above concerns.  Total time spent today, including personal review of past history, face to face time, chart documentation, and  was 70  minutes.

## 2024-12-13 ASSESSMENT — LIFESTYLE VARIABLES
TOBACCO_USE: NO
SMOKING_STATUS: NO

## 2024-12-16 ENCOUNTER — HOSPITAL ENCOUNTER (OUTPATIENT)
Dept: HEMATOLOGY ONCOLOGY | Facility: MEDICAL CENTER | Age: 64
End: 2024-12-16
Attending: STUDENT IN AN ORGANIZED HEALTH CARE EDUCATION/TRAINING PROGRAM
Payer: COMMERCIAL

## 2024-12-16 ENCOUNTER — HOSPITAL ENCOUNTER (OUTPATIENT)
Dept: RADIATION ONCOLOGY | Facility: MEDICAL CENTER | Age: 64
End: 2024-12-16
Attending: RADIOLOGY
Payer: COMMERCIAL

## 2024-12-16 VITALS
HEIGHT: 64 IN | WEIGHT: 131.17 LBS | DIASTOLIC BLOOD PRESSURE: 62 MMHG | BODY MASS INDEX: 22.39 KG/M2 | OXYGEN SATURATION: 98 % | TEMPERATURE: 98.2 F | SYSTOLIC BLOOD PRESSURE: 100 MMHG | HEART RATE: 50 BPM

## 2024-12-16 DIAGNOSIS — D05.12 DUCTAL CARCINOMA IN SITU (DCIS) OF LEFT BREAST: ICD-10-CM

## 2024-12-16 PROCEDURE — 99214 OFFICE O/P EST MOD 30 MIN: CPT | Performed by: STUDENT IN AN ORGANIZED HEALTH CARE EDUCATION/TRAINING PROGRAM

## 2024-12-16 PROCEDURE — RXMED WILLOW AMBULATORY MEDICATION CHARGE: Performed by: STUDENT IN AN ORGANIZED HEALTH CARE EDUCATION/TRAINING PROGRAM

## 2024-12-16 PROCEDURE — 99212 OFFICE O/P EST SF 10 MIN: CPT | Performed by: STUDENT IN AN ORGANIZED HEALTH CARE EDUCATION/TRAINING PROGRAM

## 2024-12-16 ASSESSMENT — FIBROSIS 4 INDEX: FIB4 SCORE: 1.42

## 2024-12-16 NOTE — PROGRESS NOTES
Telephone Appointment Visit   This telephone visit was initiated by the patient and they verbally consented.    Reason for Call:  Symptom Follow-up    HPI:    Stage 0 ([id number]) HG ductal carcinoma in situ of the left breast ER/OR positive status post lumpectomy on 9/3/2024  completing whole breast radiotherapy with a boost to 5005 cGy in November 2024 currently on an aromatase inhibitor    Patient states she had minimal erythema after completing radiation.  The only residual area she has of the hyperpigmentation is a small area in the mid axillary line.  And possibly the nipple areolar complex.  She has started on her aromatase inhibitor and does not feel any untoward effects.    Labs / Images Reviewed:   none  Assessment and Plan:     Patient continues to follow in medical oncology I will release her from active follow-up in radiation oncology.  If she does have any questions or concerns at any time she can feel free to contact me.    Follow-up: As needed    Total Time Spent (mins): 10    Flip Daly M.D.

## 2024-12-16 NOTE — PROGRESS NOTES
Consult:  Hematology/Oncology    Surgical Oncologist: Joyce Bourgeois M.D.  Radiation Oncologist: Flip Daly M.D.  Primary Care:  Meggan Isabel M.D.  Gynecologist: Rae Durbin M.D.     Diagnosis: Stage 0 pTis cN0M0 Left High Grade DCIS L Breast ER/SC+    Chief Complaint:  Establish Care     History of Presenting Illness:  Rivka Kumar is a 64 y.o. female with no previous PMH who presents to establish care after lumpectomy following diagnosis on DCIS.     History is as follows:  Patient denies any symptoms and did not notice any abnormal areas on her breast prior to screening.  7/25/24 Screening Mammogram BIRADS-0 Left breast 3:00 position group of calcifications   8/2/24  Diagnostic Mammogram BIRADS-4 biopsy recommended  8/9/24  Stereotactic Biopsy - High grade DCIS ER+ % SC+ 11-20%   8/21/24 Established Surgical Oncology  9/3/24  Partial Mastectomy - G3 DCIS 2.4cm negative margins and separate minute focus of DCIS at superior and deep margin, margins negative   9/23/24 Established Medical Oncology     Initial Consult:   She is present today with her , Matthew and her daughter Molly as a referral from Dr. Bourgeois for new diagnosis of DCIS.  She was in her USOH until a routine mammogram screening 7/25/24 where she was noted to have calcifications at the 3:00 position of the left breast.  She denies feeling any masses or lesions prior, but after the procedure noted she did note a palpable area (which she attributed to 3 attempts to retrieve the biopsy).  She underwent diagnostic mammogram and subsequently stereotactic biopsy confirming DCIS.  She underwent partial mastectomy on 9/3/24 and tolerated the surgery well with no issues. Pathology confirmed DCIS measuring 2.4cm with negative margins and a separate minute focus of DCIS at superior and deep margin, but true margins negative by >2mm. She states she managed well with pain, no issues with ROM or UE edema currently.      Discussed plan  for adjuvant endocrine therapy after she completes radiation (she is scheduled to see Dr. Daly on 10/8).  She does note some concerns in regards to side effects from endocrine therapy - namely mood swings.  She reports being emotionally labile during menopause which has since improved.  Otherwise she states she is doing and feeling well.      Interval History 24: She is doing well today.  Present today with her . Started the anastrozole with no reported side effects.  Reports she has hot flashes at baseline so has not noted any changes in frequency or intensity.  Reports she is active and exercises regularly.  Started Calcium and Vitamin D supplement.  Reports she tolerated radiation well, notes that she has some soreness at the nipple but that is continuing to improve.     Past Medical History:   Diagnosis Date    Cancer (HCC)     breast cancer     Ductal carcinoma in situ (DCIS) of left breast     High cholesterol     Hyperlipidemia     Routine health maintenance 2013    BMI Mammogram 10/2012 Colonoscopy -- @ 51 to repeat in 5 years PAP smear  always nrmal Skin lesions to see the dermatologist Immunizations Influenza never Tetanus does not recall         Past Surgical History:   Procedure Laterality Date    PB MASTECTOMY, PARTIAL Left 9/3/2024    Procedure: WIRE LOCALIZED LEFT PARTIAL MASTECTOMY, MASTOPEXY;  Surgeon: Joyce Bourgeois M.D.;  Location: SURGERY SAME DAY [facility];  Service: General    AR SUSPENSION OF BREAST Left 9/3/2024    Procedure: MASTOPEXY;  Surgeon: Joyce Bourgeois M.D.;  Location: SURGERY SAME DAY [facility];  Service: General    TUBAL COAGULATION LAPAROSCOPIC BILATERAL         Family History   Problem Relation Age of Onset    Diabetes Mother     Hypertension Mother     Stroke Mother     Diabetes Brother     Hypertension Brother     Alcohol abuse Brother     Cancer Neg Hx     Breast Cancer Neg Hx     Colorectal Cancer Neg Hx      OB History    Para Term  " AB Living   1 1 1     1   SAB IAB Ectopic Molar Multiple Live Births                    # Outcome Date GA Lbr Raimundo/2nd Weight Sex Type Anes PTL Lv   1 Term  40w0d    Vag-Spont         Obstetric Comments   Age of first delivery: 28   Menarche: 11   LMP: 2014, no HRT     Allergies as of 2024    (No Known Allergies)       Current Outpatient Medications:     anastrozole (ARIMIDEX) 1 MG Tab, Take 1 tablet by mouth every day. (Patient taking differently: Take 1 mg by mouth every day. Have not started), Disp: 30 Tablet, Rfl: 5    cycloSPORINE (RESTASIS) 0.05 % ophthalmic emulsion, Administer 1 Drop into both eyes 2 times a day., Disp: , Rfl:     Review of Systems:  ROS as above     Physical Exam:  Vitals:    24 0824   BP: 100/62   BP Location: Right arm   Patient Position: Sitting   BP Cuff Size: Adult   Pulse: (!) 50   Temp: 36.8 °C (98.2 °F)   TempSrc: Temporal   SpO2: 98%   Weight: 59.5 kg (131 lb 2.8 oz)   Height: 1.626 m (5' 4.02\")     DESC; KARNOFSKY SCALE WITH ECOG EQUIVALENT: 100, Fully active, able to carry on all pre-disease performed without restriction (ECOG equivalent 0)    DISTRESS LEVEL: no apparent distress    Physical Exam  Constitutional:       General: She is not in acute distress.     Appearance: Normal appearance.   HENT:      Head: Normocephalic and atraumatic.      Mouth/Throat:      Mouth: Mucous membranes are moist.      Pharynx: Oropharynx is clear.   Eyes:      General: Scleral icterus present.   Cardiovascular:      Pulses: Normal pulses.   Pulmonary:      Effort: Pulmonary effort is normal. No respiratory distress.   Chest:      Comments: Incision at L inferior breast c/d/I. No masses or lesions palpated, no nipple discharge bilaterally.  No axillary adenopathy.   Musculoskeletal:      Cervical back: Tenderness present. No rigidity.   Lymphadenopathy:      Cervical: No cervical adenopathy.   Skin:     General: Skin is warm and dry.   Neurological:      Mental Status: She " is alert and oriented to person, place, and time.      Labs:  Reviewed     Imaging:   Screening Mammogram 7/25/24  1.  Left breast 3:00 position group of calcifications. Additional imaging is recommended.  R0 -  CATEGORY 0: NEED ADDITIONAL IMAGING EVALUATION AND/OR PRIOR MAMMOGRAM FOR COMPARISON    Diagnostic Mammogram 8/2/24  Suspicious left breast calcifications. Recommend stereotactic core biopsy.  The patient met with the  to discuss scheduling the biopsy.  These results were given to the patient at the time of visit.  R4 - CATEGORY 4: SUSPICIOUS FINDING - (4C-FINDING NEEDING INTERVENTION WITH A MODERATE CONCERN BUT NOT CLASSIC FOR MALIGNANCY.)    Pathology:  Biopsy L Breast 3:00 8/9/24  A. Left breast, 3 o'clock calcifications, stereotactic biopsy:          High-grade ductal carcinoma in situ (DCIS) with necrosis and           calcifications.          Estrogen receptors: Positive, strong, %          Progesterone receptors: Positive, strong, 11-20%     Partial Mastectomy 9/3/24  A. Left partial mastectomy:          High-grade ductal carcinoma in situ (DCIS) with necrosis and           calcifications (see Synoptic report).          The area of DCIS measures approximately 24 mm based on presence           in 6 sections.          True final margins are all negative for DCIS (DCIS involves           initial superior margin and is less than 2 mm from initial deep           margin) (see comment).          Prior biopsy site and biopsy clip identified.     B. Superior and deep margin:          Minute focus of DCIS, margins negative for DCIS by greater than           2 mm.     DCIS OF THE BREAST: Resection     SPECIMEN    Procedure:  Excision (less than total mastectomy)     Specimen Laterality:  Left   TUMOR    Histologic Type:  Ductal carcinoma in situ     Size (Extent) of DCIS:  Estimated size (extent) of DCIS is at least     (Millimeters) - 24 mm (based on presence in 6 sections, A1,  "A3-A6,     B4)       Number of Blocks with DCIS:  6       Number of Blocks Examined:  19     Nuclear Grade:  Grade III (high)     Necrosis:  Present, central (expansive \"comedo\" necrosis)     Microcalcifications:  Present in DCIS, Present in nonneoplastic     tissue   MARGINS    Margin Status:  All margins negative for DCIS       Distance from DCIS to Closest Margin:  Greater than - 2 mm       Closest Margin(s) to DCIS:  Superior   REGIONAL LYMPH NODES     Regional Lymph Node Status:  Not applicable (no regional lymph nodes     submitted or found)   PATHOLOGIC STAGE CLASSIFICATION (pTNM, AJCC 8th Edition)   Reporting of pT, pN, and (when applicable) pM categories is based on   information available to the pathologist at the time the report is   issued. As per the AJCC (Chapter 1, 8th Ed.) it is the managing   physician's responsibility to establish the final pathologic stage   based upon all pertinent information, including but potentially not   limited to this pathology report.     pT Category:  pTis (DCIS)     pN Category:  pN not assigned (no nodes submitted or found)   Breast Biomarker Testing Performed on Previous Biopsy:     Estrogen Receptor (ER) Status:  Positive       Percentage of Cells with Nuclear Positivity:  %     Progesterone Receptor (PgR) Status:  Positive       Percentage of Cells with Nuclear Positivity:  11-20%     Testing Performed on Case Number:  [id number] (8/9/2024)     Comment: In part A, DCIS focally involves the superior margin and is   less than 2 mm from the deep margin. The true superior and deep margins   in part B are negative for DCIS.     Assessment & Plan:  Ms. Kumar if a 63 yo post-menopausal F with recently diagnosed High Grade (G3) pTiscN0 Stage 0 DCIS of the L breast measuring 2.4cm s/p partial mastectomy on 9/3/24.  She is here today to establish care.     -She has an appointment with Radiation Oncology s/p hypofractionated radiation with boost and completed 11/13/24, " s/p partial mastectomy 10/8/24  -Per NCCN guidelines, discussed pathology results from partial mastectomy.  Recommended starting endocrine therapy with anastrozole daily for total of 5 years which she started  11/27/24  -DEXA scan with osteopenia.  Will plan to repeat in 1 year.  She has started calcium/Vit D supplement and encouraged weight based exercise which she is already doing   - Will plan to follow with H&P and breast exam every 6 months thereafter for 5 years with annual mammogram.      Any questions and concerns raised by the patient and family were answered to the best of my ability. Thank you for allowing me to participate in the care for this patient. Please feel free to contact me for any questions or concerns.

## 2024-12-17 ENCOUNTER — PHARMACY VISIT (OUTPATIENT)
Dept: PHARMACY | Facility: MEDICAL CENTER | Age: 64
End: 2024-12-17
Payer: MEDICARE

## 2024-12-24 ENCOUNTER — OFFICE VISIT (OUTPATIENT)
Dept: URGENT CARE | Facility: PHYSICIAN GROUP | Age: 64
End: 2024-12-24
Payer: COMMERCIAL

## 2024-12-24 VITALS
RESPIRATION RATE: 16 BRPM | HEIGHT: 64 IN | WEIGHT: 127.87 LBS | TEMPERATURE: 97 F | BODY MASS INDEX: 21.83 KG/M2 | SYSTOLIC BLOOD PRESSURE: 124 MMHG | HEART RATE: 67 BPM | OXYGEN SATURATION: 97 % | DIASTOLIC BLOOD PRESSURE: 64 MMHG

## 2024-12-24 DIAGNOSIS — J01.00 ACUTE NON-RECURRENT MAXILLARY SINUSITIS: ICD-10-CM

## 2024-12-24 PROCEDURE — 3078F DIAST BP <80 MM HG: CPT | Performed by: PHYSICIAN ASSISTANT

## 2024-12-24 PROCEDURE — 3074F SYST BP LT 130 MM HG: CPT | Performed by: PHYSICIAN ASSISTANT

## 2024-12-24 PROCEDURE — 99214 OFFICE O/P EST MOD 30 MIN: CPT | Performed by: PHYSICIAN ASSISTANT

## 2024-12-24 ASSESSMENT — ENCOUNTER SYMPTOMS
PHOTOPHOBIA: 0
CHILLS: 0
SINUS PRESSURE: 1
SINUS PAIN: 1
DIZZINESS: 0
DOUBLE VISION: 0
EYE PAIN: 0
HEADACHES: 1
FEVER: 0
BLURRED VISION: 0

## 2024-12-24 ASSESSMENT — FIBROSIS 4 INDEX: FIB4 SCORE: 1.42

## 2024-12-24 NOTE — PROGRESS NOTES
Subjective:   Rivka Kumar is a 64 y.o. female who presents today with   Chief Complaint   Patient presents with    Fatigue     Fatigue, headache, congestion, cough, sinus pressure, body ache x 1 week, is concerned about pneumonia. Undergoing cancer tx, reduced immune system.     Sinus Problem  This is a new problem. The current episode started in the past 7 days. The problem is unchanged. There has been no fever. The pain is moderate. Associated symptoms include congestion, headaches and sinus pressure. Pertinent negatives include no chills. Past treatments include nothing. The treatment provided no relief.     Patient currently undergoing treatment for breast cancer, she just finished up radiation about 3 weeks ago.    PMH:  has a past medical history of Cancer (HCC), Ductal carcinoma in situ (DCIS) of left breast, High cholesterol, Hyperlipidemia, and Routine health maintenance (06/20/2013).  MEDS:   Current Outpatient Medications:     amoxicillin-clavulanate (AUGMENTIN) 875-125 MG Tab, Take 1 Tablet by mouth 2 times a day for 7 days., Disp: 14 Tablet, Rfl: 0    anastrozole (ARIMIDEX) 1 MG Tab, Take 1 tablet by mouth every day. (Patient taking differently: Take 1 mg by mouth every day. Have not started), Disp: 30 Tablet, Rfl: 5    cycloSPORINE (RESTASIS) 0.05 % ophthalmic emulsion, Administer 1 Drop into both eyes 2 times a day., Disp: , Rfl:   ALLERGIES: No Known Allergies  SURGHX:   Past Surgical History:   Procedure Laterality Date    PB MASTECTOMY, PARTIAL Left 9/3/2024    Procedure: WIRE LOCALIZED LEFT PARTIAL MASTECTOMY, MASTOPEXY;  Surgeon: Joyce Bourgeois M.D.;  Location: SURGERY SAME DAY [facility];  Service: General    IN SUSPENSION OF BREAST Left 9/3/2024    Procedure: MASTOPEXY;  Surgeon: Joyce Bourgeois M.D.;  Location: SURGERY SAME DAY [facility];  Service: General    TUBAL COAGULATION LAPAROSCOPIC BILATERAL  1999     SOCHX:  reports that she has never smoked. She has never used smokeless  "tobacco. She reports current alcohol use of about 4.2 oz of alcohol per week. She reports that she does not use drugs.  FH: Reviewed with patient, not pertinent to this visit.     Review of Systems   Constitutional:  Negative for chills and fever.   HENT:  Positive for congestion, sinus pressure and sinus pain.    Eyes:  Negative for blurred vision, double vision, photophobia and pain.   Neurological:  Positive for headaches. Negative for dizziness.      Objective:   /64 (BP Location: Right arm, Patient Position: Sitting, BP Cuff Size: Adult long)   Pulse 67   Temp 36.1 °C (97 °F) (Temporal)   Resp 16   Ht 1.626 m (5' 4\")   Wt 58 kg (127 lb 13.9 oz)   SpO2 97%   BMI 21.95 kg/m²   Physical Exam  Vitals and nursing note reviewed.   Constitutional:       General: She is not in acute distress.     Appearance: Normal appearance. She is well-developed. She is not ill-appearing or toxic-appearing.   HENT:      Head: Normocephalic and atraumatic.      Right Ear: Hearing normal.      Left Ear: Hearing normal.      Nose: Mucosal edema and congestion present.      Right Sinus: Maxillary sinus tenderness present.      Left Sinus: Maxillary sinus tenderness present.   Cardiovascular:      Rate and Rhythm: Normal rate and regular rhythm.      Heart sounds: Normal heart sounds.   Pulmonary:      Effort: Pulmonary effort is normal. No respiratory distress.      Breath sounds: Normal breath sounds. No stridor. No wheezing, rhonchi or rales.   Musculoskeletal:      Comments: Normal movement in all 4 extremities   Skin:     General: Skin is warm and dry.   Neurological:      Mental Status: She is alert.      Coordination: Coordination normal.   Psychiatric:         Mood and Affect: Mood normal.       Assessment/Plan:   Assessment    1. Acute non-recurrent maxillary sinusitis  - amoxicillin-clavulanate (AUGMENTIN) 875-125 MG Tab; Take 1 Tablet by mouth 2 times a day for 7 days.  Dispense: 14 Tablet; Refill: 0    Symptoms " and presentation appear consistent with sinus infection at this time with also lingering upper respiratory infection.  No signs of pneumonia at this time.  No indication for chest x-ray.  Would recommend treating with antibiotics for bacterial etiology.  Would also suggest use of sinus rinse over-the-counter per 's instructions.    Differential diagnosis, natural history, supportive care, and indications for immediate follow-up discussed.   Patient given instructions and understanding of medications and treatment.    If not improving in 3-5 days, F/U with PCP or return to  if symptoms worsen.  Strict ER precautions given with any new symptoms including any vision change or worsening of sinus pressure/headache.  Patient is agreeable with this plan and will monitor symptoms closely.  Patient agreeable to plan.    Please note that this dictation was created using voice recognition software. I have made every reasonable attempt to correct obvious errors, but I expect that there are errors of grammar and possibly content that I did not discover before finalizing the note.    Anthony Gage PA-C

## 2025-01-08 DIAGNOSIS — D05.12 DUCTAL CARCINOMA IN SITU (DCIS) OF LEFT BREAST: ICD-10-CM

## 2025-01-08 RX ORDER — ANASTROZOLE 1 MG/1
1 TABLET ORAL DAILY
Qty: 30 TABLET | Refills: 5 | Status: SHIPPED | OUTPATIENT
Start: 2025-01-08 | End: 2025-01-14 | Stop reason: SDUPTHER

## 2025-01-14 DIAGNOSIS — D05.12 DUCTAL CARCINOMA IN SITU (DCIS) OF LEFT BREAST: ICD-10-CM

## 2025-01-14 RX ORDER — ANASTROZOLE 1 MG/1
1 TABLET ORAL DAILY
Qty: 90 TABLET | Refills: 1 | Status: SHIPPED | OUTPATIENT
Start: 2025-01-14

## 2025-02-13 ENCOUNTER — OFFICE VISIT (OUTPATIENT)
Dept: MEDICAL GROUP | Facility: PHYSICIAN GROUP | Age: 65
End: 2025-02-13
Payer: COMMERCIAL

## 2025-02-13 VITALS
WEIGHT: 137.7 LBS | SYSTOLIC BLOOD PRESSURE: 100 MMHG | HEART RATE: 78 BPM | OXYGEN SATURATION: 94 % | HEIGHT: 64 IN | TEMPERATURE: 100.6 F | BODY MASS INDEX: 23.51 KG/M2 | DIASTOLIC BLOOD PRESSURE: 58 MMHG

## 2025-02-13 DIAGNOSIS — R50.9 FEVER, UNSPECIFIED FEVER CAUSE: ICD-10-CM

## 2025-02-13 DIAGNOSIS — J06.9 UPPER RESPIRATORY TRACT INFECTION, UNSPECIFIED TYPE: ICD-10-CM

## 2025-02-13 PROCEDURE — 3074F SYST BP LT 130 MM HG: CPT | Performed by: NURSE PRACTITIONER

## 2025-02-13 PROCEDURE — 99213 OFFICE O/P EST LOW 20 MIN: CPT | Performed by: NURSE PRACTITIONER

## 2025-02-13 PROCEDURE — 3078F DIAST BP <80 MM HG: CPT | Performed by: NURSE PRACTITIONER

## 2025-02-13 RX ORDER — AZITHROMYCIN 250 MG/1
TABLET, FILM COATED ORAL
Qty: 6 TABLET | Refills: 0 | Status: SHIPPED | OUTPATIENT
Start: 2025-02-13 | End: 2025-02-18

## 2025-02-13 RX ORDER — METHYLPREDNISOLONE 4 MG/1
TABLET ORAL
Qty: 21 TABLET | Refills: 0 | Status: SHIPPED | OUTPATIENT
Start: 2025-02-13 | End: 2025-02-24

## 2025-02-13 RX ORDER — BENZONATATE 100 MG/1
100 CAPSULE ORAL 3 TIMES DAILY PRN
Qty: 60 CAPSULE | Refills: 0 | Status: SHIPPED | OUTPATIENT
Start: 2025-02-13 | End: 2025-02-24

## 2025-02-13 ASSESSMENT — ENCOUNTER SYMPTOMS
COUGH: 1
SINUS PRESSURE: 0
SHORTNESS OF BREATH: 1
SINUS PAIN: 0
FATIGUE: 1
BACK PAIN: 1
HEADACHES: 1
APPETITE CHANGE: 1
LIGHT-HEADEDNESS: 0
CHEST TIGHTNESS: 1
SLEEP DISTURBANCE: 1
SORE THROAT: 1
FEVER: 1

## 2025-02-13 ASSESSMENT — PATIENT HEALTH QUESTIONNAIRE - PHQ9: CLINICAL INTERPRETATION OF PHQ2 SCORE: 0

## 2025-02-13 ASSESSMENT — FIBROSIS 4 INDEX: FIB4 SCORE: 1.42

## 2025-02-13 NOTE — PROGRESS NOTES
Verbal consent was acquired by the patient to use [employer] ambient listening note generation during this visit Yes      Subjective   Rivka Kumar is a 64 y.o. female who presents for:  History of Present Illness  The patient presents for evaluation of influenza-like symptoms.    She has been experiencing symptoms consistent with influenza for the past 8 days, characterized by lethargy, a deep cough causing chest pain, and sleep disturbances. Initially, she had a high fever of 104 to 105 degrees, which has since subsided to a low-grade fever. She reports a productive cough with thick, clear sputum. She also reports a sore throat, which she attributes to the severity of her cough, and headaches. She does not experience any sinus pressure or pain, ear pain, or lightheadedness. Her appetite has been poor. She reports no recent sick contacts but mentions that her granddaughter had a cold or flu-like illness approximately 2 weeks ago, from whom she suspects she may have contracted her current illness. She has not undergone any home testing for COVID-19 or influenza. She has previously tested positive for both influenza and COVID-19. She has been managing her symptoms with ibuprofen. She has been maintaining adequate hydration. She has received 2 previous courses of antibiotics, Augmentin and Bactrim, without any associated diarrhea. She is considering the use of Tamiflu. She has a history of a kidney infection.    She recently underwent a lumpectomy on the left side, followed by 4 weeks of radiation therapy, which concluded in November 2024. She has since started anastrozole. The lumpectomy site has healed well.    Supplemental Information  She had a sinus infection in late December 2024 and took antibiotics for it.    ALLERGIES  The patient has no known allergies.    MEDICATIONS  Current: Anastrozole, ibuprofen.  Past: Augmentin, Bactrim.    Review of Systems   Constitutional:  Positive for appetite change,  "fatigue and fever.   HENT:  Positive for sore throat. Negative for sinus pressure and sinus pain.    Respiratory:  Positive for cough, chest tightness and shortness of breath.    Musculoskeletal:  Positive for back pain.   Neurological:  Positive for headaches. Negative for light-headedness.   Psychiatric/Behavioral:  Positive for sleep disturbance.      Objective   /58 (BP Location: Left arm, Patient Position: Sitting, BP Cuff Size: Adult)   Pulse 78   Temp (!) 38.1 °C (100.6 °F) (Temporal)   Ht 1.626 m (5' 4\")   Wt 62.5 kg (137 lb 11.2 oz)   SpO2 94%   Physical Exam  General: Normal appearing. No distress.  HEENT: bilateral cerumen impaction.  Normocephalic. Eyes conjunctiva clear lids without ptosis, pupils equal and reactive to light accommodation, ears normal shape and contour, nasal mucosa benign, oropharynx is with erythema, and without edema or exudates. Sinuses (frontal and maxillary) nontender to palpation.  Pulmonary: Clear to ausculation.  Normal effort. No rales, rhonchi, or wheezing.  Cardiovascular: Regular rate and rhythm without murmur. Carotid and radial pulses are intact and equal bilaterally.  Abdomen: Soft, nontender, nondistended. Normal bowel sounds.   Neurologic: Grossly nonfocal.  Lymph: No cervical, supraclavicular or axillary lymph nodes are palpable.  Skin: Warm and dry.  No obvious lesions.  Musculoskeletal: Normal gait. No extremity cyanosis, clubbing, or edema.  Psych: Normal mood and affect. Alert and oriented x3. Judgment and insight is normal.     Assessment & Plan  1. Upper respiratory tract infection, unspecified type  2. Fever, unspecified fever cause  New to examiner, ongoing for the patient for 8 days. Symptoms include chest tightness, productive cough, fatigue, fever, back pain, poor appetite, sore throat, and headache. The patient reports a history of recent surgery and radiation, which may have compromised her immune system. She looks slightly dehydrated and her " heart rate is slightly elevated. A prescription for a Z-Bryan (azithromycin) will be provided, with instructions to take 2 pills on the first day, followed by 1 pill daily for the subsequent 4 days. Additionally, a Medrol Dosepak (methylprednisolone) will be prescribed, with a tapering dosage schedule of 6 pills on the first day, decreasing by one pill each day until completion. She is advised to avoid taking the medication close to bedtime due to potential sleep disturbances. Tessalon Perles (benzonatate) will be prescribed, to be taken as 1 pill three times daily as needed for cough relief. Over-the-counter cough medications such as guaifenesin are also recommended. Adequate hydration is emphasized to help thin mucus and support overall recovery. She is advised to buy a home test kit for COVID-19, influenza, and RSV, and to take the test if she experiences a sudden onset of fever or illness. If the test is positive, she should inform the clinic so that appropriate treatment can be initiated.  - azithromycin (ZITHROMAX) 250 MG Tab; Take 2 Tablets by mouth every day for 1 day, THEN 1 Tablet every day for 4 days.  Dispense: 6 Tablet; Refill: 0  - methylPREDNISolone (MEDROL DOSEPAK) 4 MG Tablet Therapy Pack; Follow schedule on package instructions.  Dispense: 21 Tablet; Refill: 0  - benzonatate (TESSALON) 100 MG Cap; Take 1 Capsule by mouth 3 times a day as needed for Cough.  Dispense: 60 Capsule; Refill: 0     Return if symptoms worsen or fail to improve.     Please note that this dictation was created using voice recognition software. I have made every reasonable attempt to correct obvious errors, but I expect that there are errors of grammar and possibly content that I did not discover before finalizing the note.

## 2025-02-24 ENCOUNTER — OUTPATIENT INFUSION SERVICES (OUTPATIENT)
Dept: ONCOLOGY | Facility: MEDICAL CENTER | Age: 65
End: 2025-02-24
Attending: STUDENT IN AN ORGANIZED HEALTH CARE EDUCATION/TRAINING PROGRAM
Payer: COMMERCIAL

## 2025-02-24 VITALS
HEART RATE: 69 BPM | RESPIRATION RATE: 16 BRPM | DIASTOLIC BLOOD PRESSURE: 61 MMHG | SYSTOLIC BLOOD PRESSURE: 110 MMHG | HEIGHT: 64 IN | TEMPERATURE: 97.3 F | WEIGHT: 124.78 LBS | OXYGEN SATURATION: 96 % | BODY MASS INDEX: 21.3 KG/M2

## 2025-02-24 DIAGNOSIS — D05.12 DUCTAL CARCINOMA IN SITU (DCIS) OF LEFT BREAST: ICD-10-CM

## 2025-02-24 LAB
CA-I BLD ISE-SCNC: 1.18 MMOL/L (ref 1.1–1.3)
CREAT BLD-MCNC: 0.9 MG/DL (ref 0.5–1.4)

## 2025-02-24 PROCEDURE — 82565 ASSAY OF CREATININE: CPT

## 2025-02-24 PROCEDURE — 96372 THER/PROPH/DIAG INJ SC/IM: CPT

## 2025-02-24 PROCEDURE — 82330 ASSAY OF CALCIUM: CPT

## 2025-02-24 PROCEDURE — 36415 COLL VENOUS BLD VENIPUNCTURE: CPT

## 2025-02-24 PROCEDURE — 700111 HCHG RX REV CODE 636 W/ 250 OVERRIDE (IP): Mod: JZ | Performed by: STUDENT IN AN ORGANIZED HEALTH CARE EDUCATION/TRAINING PROGRAM

## 2025-02-24 RX ORDER — EPINEPHRINE 1 MG/ML(1)
0.5 AMPUL (ML) INJECTION PRN
OUTPATIENT
Start: 2025-08-23

## 2025-02-24 RX ORDER — METHYLPREDNISOLONE SODIUM SUCCINATE 125 MG/2ML
125 INJECTION, POWDER, LYOPHILIZED, FOR SOLUTION INTRAMUSCULAR; INTRAVENOUS PRN
OUTPATIENT
Start: 2025-08-23

## 2025-02-24 RX ORDER — DIPHENHYDRAMINE HYDROCHLORIDE 50 MG/ML
50 INJECTION INTRAMUSCULAR; INTRAVENOUS PRN
OUTPATIENT
Start: 2025-08-23

## 2025-02-24 RX ORDER — ALBUTEROL SULFATE 90 UG/1
2 INHALANT RESPIRATORY (INHALATION) PRN
OUTPATIENT
Start: 2025-08-23

## 2025-02-24 RX ADMIN — DENOSUMAB 60 MG: 60 INJECTION SUBCUTANEOUS at 09:17

## 2025-02-24 ASSESSMENT — FIBROSIS 4 INDEX: FIB4 SCORE: 1.42

## 2025-02-24 NOTE — PROGRESS NOTES
Pt arrives to [facility] for first dose of Prolia.  Discussed plan of care with pt.  Pt denies any s/sx of infection or recent/planned dental procedures.  ISTAT calcium and creatinine drawn via 23g butterfly needle to R-AC.  Labs reviewed and ok to give Prolia per pharmacy.  Prolia given SC to back of RUE.  Email sent to scheduling dept to set up next appt in 6 months.  Pt dc home to self care.

## 2025-04-17 ENCOUNTER — OFFICE VISIT (OUTPATIENT)
Dept: URGENT CARE | Facility: PHYSICIAN GROUP | Age: 65
End: 2025-04-17
Payer: COMMERCIAL

## 2025-04-17 VITALS
RESPIRATION RATE: 14 BRPM | TEMPERATURE: 98.2 F | BODY MASS INDEX: 21.27 KG/M2 | OXYGEN SATURATION: 97 % | SYSTOLIC BLOOD PRESSURE: 116 MMHG | HEIGHT: 64 IN | WEIGHT: 124.56 LBS | DIASTOLIC BLOOD PRESSURE: 60 MMHG | HEART RATE: 79 BPM

## 2025-04-17 DIAGNOSIS — R05.1 ACUTE COUGH: ICD-10-CM

## 2025-04-17 DIAGNOSIS — J06.9 VIRAL URI WITH COUGH: ICD-10-CM

## 2025-04-17 DIAGNOSIS — R09.82 PND (POST-NASAL DRIP): ICD-10-CM

## 2025-04-17 PROCEDURE — 3074F SYST BP LT 130 MM HG: CPT | Performed by: NURSE PRACTITIONER

## 2025-04-17 PROCEDURE — 99214 OFFICE O/P EST MOD 30 MIN: CPT | Performed by: NURSE PRACTITIONER

## 2025-04-17 PROCEDURE — 3078F DIAST BP <80 MM HG: CPT | Performed by: NURSE PRACTITIONER

## 2025-04-17 ASSESSMENT — ENCOUNTER SYMPTOMS
WHEEZING: 0
SINUS PAIN: 0
EYE REDNESS: 0
SHORTNESS OF BREATH: 0
NEUROLOGICAL NEGATIVE: 1
MUSCULOSKELETAL NEGATIVE: 1
EYE DISCHARGE: 0
CHILLS: 0
FEVER: 1
GASTROINTESTINAL NEGATIVE: 1
SORE THROAT: 0
COUGH: 1

## 2025-04-17 ASSESSMENT — FIBROSIS 4 INDEX: FIB4 SCORE: 1.42

## 2025-04-17 NOTE — PROGRESS NOTES
Subjective     Rivka Kumar is a 64 y.o. female who presents with Cough (Cough,slight fever,fatigue,chest congestion x 2 weeks )            Cough  Associated symptoms include a fever. Pertinent negatives include no chills, ear pain, eye redness, sore throat, shortness of breath or wheezing.   Rivka has come into urgent care today as she has been experiencing recurring cough, congestion, fatigue with fever last night up to 104.  States has been coughing x 2 weeks.  Denies history of asthma, wheeze or shortness of breath with cough.  States after her mastectomy from breast cancer last year she states that her immune system has been causing her to be sick more frequently.  Seen by her primary care provider back in February 2025 for respiratory illness and was placed on antibiotics, steroids and Tessalon Perles.   has not tried Tessalon Perles for cough.  Experiencing mild postnasal drainage with dry scratchy throat.    PMH:  has a past medical history of Cancer (HCC) ([id number]), Ductal carcinoma in situ (DCIS) of left breast, High cholesterol (47376403), Hyperlipidemia, and Routine health maintenance (06/20/2013).  MEDS:   Current Outpatient Medications:     Calcium Carb-Cholecalciferol (CALCIUM 600 + D PO), Take 1 Tablet by mouth 2 times a day., Disp: , Rfl:     anastrozole (ARIMIDEX) 1 MG Tab, Take 1 tablet by mouth every day., Disp: 90 Tablet, Rfl: 1    cycloSPORINE (RESTASIS) 0.05 % ophthalmic emulsion, Administer 1 Drop into both eyes 2 times a day., Disp: , Rfl:   ALLERGIES: No Known Allergies  SURGHX:   Past Surgical History:   Procedure Laterality Date    PB MASTECTOMY, PARTIAL Left 09/03/2024    Procedure: WIRE LOCALIZED LEFT PARTIAL MASTECTOMY, MASTOPEXY;  Surgeon: Joyce Bourgeois M.D.;  Location: SURGERY SAME DAY [facility];  Service: General    RI SUSPENSION OF BREAST Left 09/03/2024    Procedure: MASTOPEXY;  Surgeon: Joyce Bourgeois M.D.;  Location: SURGERY SAME DAY [facility];  Service: General  "   TUBAL COAGULATION LAPAROSCOPIC BILATERAL       SOCHX:  reports that she has never smoked. She has never used smokeless tobacco. She reports current alcohol use of about 4.2 oz of alcohol per week. She reports that she does not use drugs.  FH: Family history was reviewed, no pertinent findings to report      Review of Systems   Constitutional:  Positive for fever and malaise/fatigue. Negative for chills.   HENT:  Positive for congestion. Negative for ear pain, sinus pain and sore throat.    Eyes:  Negative for discharge and redness.   Respiratory:  Positive for cough. Negative for shortness of breath and wheezing.    Gastrointestinal: Negative.    Musculoskeletal: Negative.    Neurological: Negative.    All other systems reviewed and are negative.             Objective     /60   Pulse 79   Temp 36.8 °C (98.2 °F) (Temporal)   Resp 14   Ht 1.626 m (5' 4\")   Wt 56.5 kg (124 lb 9 oz)   SpO2 97%   BMI 21.38 kg/m²      Physical Exam  Vitals reviewed.   Constitutional:       General: She is awake. She is not in acute distress.     Appearance: She is not ill-appearing, toxic-appearing or diaphoretic.   HENT:      Right Ear: Tympanic membrane, ear canal and external ear normal.      Left Ear: Tympanic membrane, ear canal and external ear normal.      Nose: Nose normal.      Mouth/Throat:      Lips: Pink.      Mouth: Mucous membranes are dry.      Pharynx: Oropharynx is clear. Uvula midline.   Cardiovascular:      Rate and Rhythm: Normal rate.   Pulmonary:      Effort: Pulmonary effort is normal.      Breath sounds: Normal breath sounds and air entry. No stridor, decreased air movement or transmitted upper airway sounds. No decreased breath sounds, wheezing, rhonchi or rales.   Skin:     General: Skin is warm and dry.   Neurological:      Mental Status: She is alert and oriented to person, place, and time.   Psychiatric:         Behavior: Behavior normal. Behavior is cooperative.                                "   Assessment & Plan  Acute cough         PND (post-nasal drip)         Viral URI with cough  - Declines viral testing at this time  -Patient still has Tessalon Perles at home and will try this for cough  --Maintain hydration/water intake  -May use over the counter Ibuprofen/Tylenol as needed for any fever, sore throat, headache or bodyaches  -May use humidifier/vaporizer at home as needed for any dry cough/nasal passages  -Gargle salt water or throat lozenges as needed for any throat irritation/dry cough  -Get rest  -May use daily longer acting antihistamine as needed (no decongestant) for any post nasal drainage   -May use saline nasal spray/Flonase as needed to flush any nasal congestion/post nasal drainage   -Monitor for fevers, worse cough, phlegm, shortness of breath, wheeze, chest tightness- need re-evaluation       -Education provided: see above    -Return to urgent care as needed if new or worsening symptoms  -Patient expresses understanding to treatment and plan of care  -Questions were encouraged and answered  -Recommended over the counter meds were written down when requested   -Advised to follow-up with the primary care provider for recheck, reevaluation, and consideration of further management as needed     -Time spent evaluating this patient was at least 30 minutes. This time comprises of the following: preparing for visit/chart review, patient history taken into account pertinent to symptoms, patient counseling/education for needed treatment outpatient, exam/evaluation/treatment plan provided, ordering any appropriate lab/test/procedures/meds, independent interpretation of any clinic testing, medication management with any other listed medications and chart documentation.

## 2025-04-23 ENCOUNTER — OFFICE VISIT (OUTPATIENT)
Dept: SURGERY | Facility: MEDICAL CENTER | Age: 65
End: 2025-04-23
Payer: COMMERCIAL

## 2025-04-23 VITALS
OXYGEN SATURATION: 96 % | HEIGHT: 64 IN | TEMPERATURE: 98.1 F | BODY MASS INDEX: 20.93 KG/M2 | WEIGHT: 122.6 LBS | HEART RATE: 75 BPM | SYSTOLIC BLOOD PRESSURE: 120 MMHG | DIASTOLIC BLOOD PRESSURE: 78 MMHG

## 2025-04-23 DIAGNOSIS — D05.12 DUCTAL CARCINOMA IN SITU (DCIS) OF LEFT BREAST: ICD-10-CM

## 2025-04-23 PROCEDURE — 99215 OFFICE O/P EST HI 40 MIN: CPT

## 2025-04-23 PROCEDURE — 3074F SYST BP LT 130 MM HG: CPT

## 2025-04-23 PROCEDURE — 3078F DIAST BP <80 MM HG: CPT

## 2025-04-23 ASSESSMENT — FIBROSIS 4 INDEX: FIB4 SCORE: 1.42

## 2025-04-23 NOTE — PROGRESS NOTES
"    Subjective:   4/23/2025  8:21 AM    Imaging facility:  [facility]   Primary care provider:  Meggan Isabel M.D.  Gynecologist:  Dr. Rae Durbin  Medical oncologist:  Dr. Reema Ocasio  Radiation oncologist:  Dr. Flip Daly  Oncology Wellness: Dr. Bernarda Daly    Chief Complaint:   Chief Complaint   Patient presents with    Follow-Up     6 month follow up.       Interval history:  Rivka returns for routine FU regarding LEFT wire localized partial mastectomy with inframammary crescent mastopexy 9/3/24. She denies any pain, feels her range of motion is about 95% back to her baseline and denies any breast or arm swelling. She noticed skin discoloration to her left lateral breast after completing radiation but otherwise denies any concerning breast findings. She is very happy to report that she has had no side effects from the Anastrozole. She is sincerely grateful to the care she's received throughout her treatment.     Initial presentation:   8/21/24: This very nice 64 y.o. year old female is kindly referred for consultation regarding LEFT breast DCIS.  Prior to her biopsy, she had no palpable breast lump, nipple discharge, skin change, or other change on exam.  She did develop a lump after her biopsies, and mentions that they had to try three times through different approaches before they succeeded.  No previous breast biopsies, just some cyst aspirations.  No previous breast surgery.       She is extremely active and exercises regularly.  She does [employer] which includes quite a bit of running, TRX, and strength trains.  She hikes regularly and works in her yard.       The patient is accompanied by her  Matthew and her daughter Molly, who came prepared with a list of questions, following careful review of the pathology report.    LABS:  No results found for: \"HBA1C\"     Patient Active Problem List   Diagnosis    Ductal carcinoma in situ (DCIS) of left breast       Past Surgical History: "   Procedure Laterality Date    PB MASTECTOMY, PARTIAL Left 2024    Procedure: WIRE LOCALIZED LEFT PARTIAL MASTECTOMY, MASTOPEXY;  Surgeon: Joyce Bourgeois M.D.;  Location: SURGERY SAME DAY [facility];  Service: General    ME SUSPENSION OF BREAST Left 2024    Procedure: MASTOPEXY;  Surgeon: Joyce Bourgeois M.D.;  Location: SURGERY SAME DAY [facility];  Service: General    TUBAL COAGULATION LAPAROSCOPIC BILATERAL         No Known Allergies    Current Outpatient Medications   Medication Sig    Calcium Carb-Cholecalciferol (CALCIUM 600 + D PO) Take 1 Tablet by mouth 2 times a day.    anastrozole (ARIMIDEX) 1 MG Tab Take 1 tablet by mouth every day.    cycloSPORINE (RESTASIS) 0.05 % ophthalmic emulsion Administer 1 Drop into both eyes 2 times a day.       Social History     Tobacco Use    Smoking status: Never    Smokeless tobacco: Never   Vaping Use    Vaping status: Never Used   Substance Use Topics    Alcohol use: Yes     Alcohol/week: 3.0 - 4.2 oz     Types: 5 - 7 Glasses of wine per week     Comment: wine with dinner    Drug use: Never        Family and Occupational History     Socioeconomic History    Marital status:      Spouse name: Not on file    Number of children: Not on file    Years of education: Not on file    Highest education level: Bachelor's degree (e.g., BA, AB, BS)   Occupational History    Not on file       Family History   Problem Relation Age of Onset    Diabetes Mother     Hypertension Mother     Stroke Mother     Diabetes Brother     Hypertension Brother     Heart Disease Brother     Hyperlipidemia Brother     Alcohol abuse Brother     Alcohol abuse Brother     Cancer Neg Hx     Breast Cancer Neg Hx     Colorectal Cancer Neg Hx        OB History    Para Term  AB Living   1 1 1 0 0 1   SAB IAB Ectopic Molar Multiple Live Births   0 0 0 0 0 0   Obstetric Comments   Age of first delivery: 29   Menarche: 11   LMP: 2014, no HRT         Review of Systems   Constitutional:  "Negative.    HENT:  Negative.     Eyes: Negative.    Respiratory: Negative.     Cardiovascular: Negative.    Gastrointestinal: Negative.    Endocrine: Negative.    Genitourinary: Negative.     Musculoskeletal: Negative.    Skin: Negative.    Neurological: Negative.    Hematological: Negative.    Psychiatric/Behavioral: Negative.            Objective:   /78 (BP Location: Right arm, Patient Position: Sitting, BP Cuff Size: Large adult)   Pulse 75   Temp 36.7 °C (98.1 °F) (Temporal)   Ht 1.626 m (5' 4\")   Wt 55.6 kg (122 lb 9.6 oz)   SpO2 96%   BMI 21.04 kg/m²       Physical Exam  Constitutional:       Appearance: Normal appearance.   HENT:      Head: Normocephalic.   Cardiovascular:      Rate and Rhythm: Normal rate and regular rhythm.      Pulses: Normal pulses.      Heart sounds: Normal heart sounds.   Pulmonary:      Effort: Pulmonary effort is normal.      Breath sounds: Normal breath sounds.   Chest:      Comments: Bilateral breasts examined in the upright and supine positions.  No suspicious skin changes (erythema, peau d'orange).  No unexpected contour abnormalities. R > L. Bilateral breast tissue heterogeneously dense with no dominant masses or nodules.  Bilateral nipples everted without expressible discharge.  No palpable cervical, supraclavicular, or axillary adenopathy bilaterally.    Surgical site: LEFT IMF crescent incision has healed as expected with minimal expected inferior volume loss on palpation.   Neurological:      Mental Status: She is alert and oriented to person, place, and time.   Psychiatric:         Mood and Affect: Mood normal.         Behavior: Behavior normal.      Comments: Very pleasant demeanor           FUNCTIONAL ASSESSMENT  Patient responses to questions:    Have you ever had shoulder surgery or been treated for a shoulder injury that resulted in a loss of range of motion?  No     Are you unable to reach into an upper cabinet and retrieve a cup or plate?  No     Do you " have any limitations in daily activities because of your shoulder?  No     Overhead raise test for shoulder flexion:  Normal Range:  150-180 degrees    (L - 162, R - 160)    Diagnosis:     1. Ductal carcinoma in situ (DCIS) of left breast            Medical Decision Making:  Today's Assessment / Status / Plan:     ASSESSMENT:  LEFT lateral (3:00) HG DCIS with necrosis and calcs. Pathologic Stage 0 (pTis cN0 M0).  Stereo biopsy 24.  LEFT wire localized partial mastectomy with inframammary crescent mastopexy 9/3/24. Anastrozole x5yrs, started 2024 ([facility]). Whole breast XRT using hypofractionated tx with a boost, completed 24 ([place]).               2.4cm.  FINAL MARGINS CLEAR by at least 2mm.  Biopsy clip recovered.  (Approximately 2cm total area, pre-biopsy.  HM COIL present superficial to a small hematoma on bedside US.)              Grade 3.  Central, expansive comedonecrosis.                  ER %.  SD 11-20%.     Last bilateral mammogram 24 [facility]:  Heterogen dense.  Last prior mammo .     FH:  No known malignancy, though father's history is unknown.  Small panel ([facility], [facility]) negative 24.  Large panel negative ([facility]).       Menopausal/HRT status:  .  Age of first delivery: 28   Menarche: 11   LMP: 2014, no HRT      LIFESTYLE:  No smoking or vaping history.  7 alcohol per week.  She has one glass of red wine daily, encouraged by her  , in the hope of improving her health.  Discussed current data on alcohol and risk of breast cancer.  No drug history.  BMI 21.  Exercises regularly and stays very active.  Generally healthy and balanced approach to food.       ECOG 0= Fully active, able to carry on all pre-disease performance without restriction.      DISCUSSED/PLAN:  We have discussed the importance of self breast examination and to monitor for changes in the breast such as new masses, nipple discharge, swelling, redness, skin dimpling, or other  concerning changes. If these are to occur she has been advised to notify our office. We discussed that scar tissue can be normal, but enlarging lumps should be further assessed. We also discussed the difference between screening and diagnostic mammograms.     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     She will be due for her next imaging AUGUST 2025. Discussed with Dr. Ocasio and order has been updated to BILATERAL diagnostic mammogram.     Advised patient to continue their follow-up schedule with medical oncology. She may return on an as needed basis.      Total time spent today, including personal review of outside records, face to face time (25 minutes), chart documentation, and  was 40 minutes.

## 2025-04-24 ASSESSMENT — ENCOUNTER SYMPTOMS
CARDIOVASCULAR NEGATIVE: 1
PSYCHIATRIC NEGATIVE: 1
RESPIRATORY NEGATIVE: 1
EYES NEGATIVE: 1
GASTROINTESTINAL NEGATIVE: 1
NEUROLOGICAL NEGATIVE: 1
MUSCULOSKELETAL NEGATIVE: 1
HEMATOLOGIC/LYMPHATIC NEGATIVE: 1
ENDOCRINE NEGATIVE: 1
CONSTITUTIONAL NEGATIVE: 1

## 2025-06-17 DIAGNOSIS — D05.12 DUCTAL CARCINOMA IN SITU (DCIS) OF LEFT BREAST: ICD-10-CM

## 2025-06-18 RX ORDER — ANASTROZOLE 1 MG/1
1 TABLET ORAL DAILY
Qty: 90 TABLET | Refills: 1 | Status: SHIPPED | OUTPATIENT
Start: 2025-06-18

## 2025-06-20 SDOH — ECONOMIC STABILITY: INCOME INSECURITY: IN THE LAST 12 MONTHS, WAS THERE A TIME WHEN YOU WERE NOT ABLE TO PAY THE MORTGAGE OR RENT ON TIME?: NO

## 2025-06-20 SDOH — HEALTH STABILITY: PHYSICAL HEALTH: ON AVERAGE, HOW MANY MINUTES DO YOU ENGAGE IN EXERCISE AT THIS LEVEL?: 60 MIN

## 2025-06-20 SDOH — ECONOMIC STABILITY: FOOD INSECURITY: WITHIN THE PAST 12 MONTHS, YOU WORRIED THAT YOUR FOOD WOULD RUN OUT BEFORE YOU GOT MONEY TO BUY MORE.: NEVER TRUE

## 2025-06-20 SDOH — HEALTH STABILITY: PHYSICAL HEALTH: ON AVERAGE, HOW MANY DAYS PER WEEK DO YOU ENGAGE IN MODERATE TO STRENUOUS EXERCISE (LIKE A BRISK WALK)?: 5 DAYS

## 2025-06-20 SDOH — ECONOMIC STABILITY: INCOME INSECURITY: HOW HARD IS IT FOR YOU TO PAY FOR THE VERY BASICS LIKE FOOD, HOUSING, MEDICAL CARE, AND HEATING?: NOT HARD AT ALL

## 2025-06-20 SDOH — ECONOMIC STABILITY: FOOD INSECURITY: WITHIN THE PAST 12 MONTHS, THE FOOD YOU BOUGHT JUST DIDN'T LAST AND YOU DIDN'T HAVE MONEY TO GET MORE.: NEVER TRUE

## 2025-06-20 SDOH — HEALTH STABILITY: MENTAL HEALTH
STRESS IS WHEN SOMEONE FEELS TENSE, NERVOUS, ANXIOUS, OR CAN'T SLEEP AT NIGHT BECAUSE THEIR MIND IS TROUBLED. HOW STRESSED ARE YOU?: ONLY A LITTLE

## 2025-06-20 ASSESSMENT — SOCIAL DETERMINANTS OF HEALTH (SDOH)
IN A TYPICAL WEEK, HOW MANY TIMES DO YOU TALK ON THE PHONE WITH FAMILY, FRIENDS, OR NEIGHBORS?: MORE THAN THREE TIMES A WEEK
HOW OFTEN DO YOU ATTENT MEETINGS OF THE CLUB OR ORGANIZATION YOU BELONG TO?: NEVER
DO YOU BELONG TO ANY CLUBS OR ORGANIZATIONS SUCH AS CHURCH GROUPS UNIONS, FRATERNAL OR ATHLETIC GROUPS, OR SCHOOL GROUPS?: NO
HOW OFTEN DO YOU ATTEND CHURCH OR RELIGIOUS SERVICES?: NEVER
HOW OFTEN DO YOU HAVE A DRINK CONTAINING ALCOHOL: 4 OR MORE TIMES A WEEK
HOW OFTEN DO YOU ATTENT MEETINGS OF THE CLUB OR ORGANIZATION YOU BELONG TO?: NEVER
HOW HARD IS IT FOR YOU TO PAY FOR THE VERY BASICS LIKE FOOD, HOUSING, MEDICAL CARE, AND HEATING?: NOT HARD AT ALL
DO YOU BELONG TO ANY CLUBS OR ORGANIZATIONS SUCH AS CHURCH GROUPS UNIONS, FRATERNAL OR ATHLETIC GROUPS, OR SCHOOL GROUPS?: NO
HOW MANY DRINKS CONTAINING ALCOHOL DO YOU HAVE ON A TYPICAL DAY WHEN YOU ARE DRINKING: 1 OR 2
IN A TYPICAL WEEK, HOW MANY TIMES DO YOU TALK ON THE PHONE WITH FAMILY, FRIENDS, OR NEIGHBORS?: MORE THAN THREE TIMES A WEEK
HOW OFTEN DO YOU GET TOGETHER WITH FRIENDS OR RELATIVES?: MORE THAN THREE TIMES A WEEK
IN THE PAST 12 MONTHS, HAS THE ELECTRIC, GAS, OIL, OR WATER COMPANY THREATENED TO SHUT OFF SERVICE IN YOUR HOME?: NO
HOW OFTEN DO YOU GET TOGETHER WITH FRIENDS OR RELATIVES?: MORE THAN THREE TIMES A WEEK
HOW OFTEN DO YOU ATTEND CHURCH OR RELIGIOUS SERVICES?: NEVER
HOW OFTEN DO YOU HAVE SIX OR MORE DRINKS ON ONE OCCASION: NEVER
WITHIN THE PAST 12 MONTHS, YOU WORRIED THAT YOUR FOOD WOULD RUN OUT BEFORE YOU GOT THE MONEY TO BUY MORE: NEVER TRUE

## 2025-06-20 ASSESSMENT — LIFESTYLE VARIABLES
HOW MANY STANDARD DRINKS CONTAINING ALCOHOL DO YOU HAVE ON A TYPICAL DAY: 1 OR 2
HOW OFTEN DO YOU HAVE SIX OR MORE DRINKS ON ONE OCCASION: NEVER
HOW OFTEN DO YOU HAVE A DRINK CONTAINING ALCOHOL: 4 OR MORE TIMES A WEEK
AUDIT-C TOTAL SCORE: 4
SKIP TO QUESTIONS 9-10: 1

## 2025-06-23 ENCOUNTER — OFFICE VISIT (OUTPATIENT)
Dept: MEDICAL GROUP | Facility: PHYSICIAN GROUP | Age: 65
End: 2025-06-23
Payer: COMMERCIAL

## 2025-06-23 VITALS
WEIGHT: 126 LBS | OXYGEN SATURATION: 100 % | SYSTOLIC BLOOD PRESSURE: 110 MMHG | HEART RATE: 54 BPM | BODY MASS INDEX: 21.51 KG/M2 | HEIGHT: 64 IN | TEMPERATURE: 97.2 F | DIASTOLIC BLOOD PRESSURE: 62 MMHG

## 2025-06-23 DIAGNOSIS — E55.9 VITAMIN D DEFICIENCY: ICD-10-CM

## 2025-06-23 DIAGNOSIS — Z13.29 SCREENING FOR THYROID DISORDER: ICD-10-CM

## 2025-06-23 DIAGNOSIS — Z00.00 WELLNESS EXAMINATION: Primary | ICD-10-CM

## 2025-06-23 PROCEDURE — 3074F SYST BP LT 130 MM HG: CPT | Performed by: STUDENT IN AN ORGANIZED HEALTH CARE EDUCATION/TRAINING PROGRAM

## 2025-06-23 PROCEDURE — 99396 PREV VISIT EST AGE 40-64: CPT | Performed by: STUDENT IN AN ORGANIZED HEALTH CARE EDUCATION/TRAINING PROGRAM

## 2025-06-23 PROCEDURE — 3078F DIAST BP <80 MM HG: CPT | Performed by: STUDENT IN AN ORGANIZED HEALTH CARE EDUCATION/TRAINING PROGRAM

## 2025-06-23 ASSESSMENT — FIBROSIS 4 INDEX: FIB4 SCORE: 1.44

## 2025-06-23 NOTE — PROGRESS NOTES
Subjective:     CC:   Chief Complaint   Patient presents with    Annual Exam       HPI:   Rivka Kumar is a 65 y.o. female who presents for annual exam. She is feeling well and denies any complaints.    Ob-Gyn/ History:    Patient has GYN provider: Yes  /Para:    Last Pap Smear:  10/2024. No history of abnormal pap smears.  Gyn Surgery:  Tubal ligation, endometrial bioposy  Currently sexually active.  Last menstrual period: > 10 years ago  No significant bloating/fluid retention, pelvic pain, or dyspareunia. No vaginal discharge  Post-menopausal bleeding: None  Urinary incontinence: None  Folate intake:     Health Maintenance  Advanced directive: N/A  Osteoporosis Screen/ DEXA: N/A  PT for falls prevention: N/A  Cholesterol Screening: Lipid panel order provided  Diabetes Screening: Fasting CMP order provided  Aspirin Use: N/A    Anticipatory Guidance  Diet: She has not had changes to her diet, her diet is described as well balanced.  Exercise: She exercises about 4-5 days a week. She does Crossfit and hikes.  Substance Abuse: N/A  Safe in relationship.   Seat belts, bike helmet, gun safety discussed.  Sun protection used.  Dental Home.    Cancer screening  Colorectal Cancer Screening: Completed in 2021. Due next in 2031  Lung Cancer Screening: N/A  Cervical Cancer Screening: Completed in 10/2024  Breast Cancer Screening: Scheduled for 2025    Infectious disease screening/Immunizations  --STI Screening: Declined  --Practices safe sex.  --HIV Screening: Declined  --Hepatitis C Screening: Completed  --Immunizations:    Influenza: Recommended to receive in the fall   HPV:  N/A   Tetanus: Due next in 2030   Shingles: Patient has received the first dose, states she needs to receive the second dose   Pneumococcal: N/A   Hepatitis B: N/A  COVID-19: Completed  Other immunizations: N/A    She  has a past medical history of Cancer (HCC) (70481261), Ductal carcinoma in situ (DCIS) of left breast,  High cholesterol (59580515), Hyperlipidemia, and Routine health maintenance (06/20/2013).  She  has a past surgical history that includes tubal coagulation laparoscopic bilateral; pr mastectomy, partial (Left, 09/03/2024); and pr suspension of breast (Left, 09/03/2024).    Family History   Problem Relation Age of Onset    Diabetes Mother     Hypertension Mother     Stroke Mother     Diabetes Brother     Hypertension Brother     Heart Disease Brother     Hyperlipidemia Brother     Alcohol abuse Brother     Alcohol abuse Brother     Cancer Neg Hx     Breast Cancer Neg Hx     Colorectal Cancer Neg Hx        Social History     Socioeconomic History    Marital status:      Spouse name: Not on file    Number of children: Not on file    Years of education: Not on file    Highest education level: Bachelor's degree (e.g., BA, AB, BS)   Occupational History    Not on file   Tobacco Use    Smoking status: Never    Smokeless tobacco: Never   Vaping Use    Vaping status: Never Used   Substance and Sexual Activity    Alcohol use: Yes     Alcohol/week: 3.0 - 4.2 oz     Types: 5 - 7 Glasses of wine per week     Comment: wine with dinner    Drug use: Never    Sexual activity: Yes     Partners: Male     Birth control/protection: Female Sterilization, Post-Menopausal     Comment: works with post service   Other Topics Concern    Not on file   Social History Narrative    Not on file     Social Drivers of Health     Financial Resource Strain: Low Risk  (6/20/2025)    Overall Financial Resource Strain (CARDIA)     Difficulty of Paying Living Expenses: Not hard at all   Food Insecurity: No Food Insecurity (6/20/2025)    Hunger Vital Sign     Worried About Running Out of Food in the Last Year: Never true     Ran Out of Food in the Last Year: Never true   Transportation Needs: No Transportation Needs (6/20/2025)    PRAPARE - Transportation     Lack of Transportation (Medical): No     Lack of Transportation (Non-Medical): No    Physical Activity: Sufficiently Active (6/20/2025)    Exercise Vital Sign     Days of Exercise per Week: 5 days     Minutes of Exercise per Session: 60 min   Stress: No Stress Concern Present (6/20/2025)    [employer] [place] - Occupational Stress Questionnaire     Feeling of Stress : Only a little   Social Connections: Moderately Isolated (6/20/2025)    Social Connection and Isolation Panel [NHANES]     Frequency of Communication with Friends and Family: More than three times a week     Frequency of Social Gatherings with Friends and Family: More than three times a week     Attends Hindu Services: Never     Active Member of Clubs or Organizations: No     Attends Club or Organization Meetings: Never     Marital Status:    Intimate Partner Violence: Not on file   Housing Stability: Unknown (6/20/2025)    Housing Stability Vital Sign     Unable to Pay for Housing in the Last Year: No     Number of Times Moved in the Last Year: Not on file     Homeless in the Last Year: No       Patient Active Problem List    Diagnosis Date Noted    Ductal carcinoma in situ (DCIS) of left breast 08/15/2024         Current Outpatient Medications   Medication Sig Dispense Refill    anastrozole (ARIMIDEX) 1 MG Tab TAKE 1 TABLET DAILY 90 Tablet 1    Calcium Carb-Cholecalciferol (CALCIUM 600 + D PO) Take 1 Tablet by mouth 2 times a day.      cycloSPORINE (RESTASIS) 0.05 % ophthalmic emulsion Administer 1 Drop into both eyes 2 times a day.       No current facility-administered medications for this visit.     No Known Allergies    Review of Systems   Constitutional: Negative for fever, chills and malaise/fatigue.   HENT: Negative for congestion.    Eyes: Negative for pain.    Respiratory: Negative for cough and shortness of breath.  Cardiovascular: Negative for leg swelling.   Gastrointestinal: Negative for nausea, vomiting, abdominal pain and diarrhea.   Genitourinary: Negative for dysuria and hematuria.  "  Skin: Negative for rash.   Neurological: Negative for dizziness, focal weakness and headaches.   Endo/Heme/Allergies: Does not bleed easily.   Psychiatric/Behavioral: Negative for depression. The patient is not nervous/anxious.      Objective:     /62 (BP Location: Left arm, Patient Position: Sitting, BP Cuff Size: Adult)   Pulse (!) 54   Temp 36.2 °C (97.2 °F) (Temporal)   Ht 1.626 m (5' 4\")   Wt 57.2 kg (126 lb)   SpO2 100%   BMI 21.63 kg/m²   Body mass index is 21.63 kg/m².  Wt Readings from Last 4 Encounters:   06/23/25 57.2 kg (126 lb)   04/23/25 55.6 kg (122 lb 9.6 oz)   04/17/25 56.5 kg (124 lb 9 oz)   02/24/25 56.6 kg (124 lb 12.5 oz)       Physical Exam:  Constitutional: Well-developed and well-nourished. Not diaphoretic. No distress.   Skin: Skin is warm and dry. No rash noted.  Head: Atraumatic without lesions.  Eyes: Conjunctivae and extraocular motions are normal. Pupils are equal, round, and reactive to light. No scleral icterus.   Ears:  External ears unremarkable. Bilateral cerumen  Nose: Nares patent. Septum midline. Turbinates without erythema nor edema. No discharge.   Mouth/Throat: Dentition is good. Tongue normal. Oropharynx is clear and moist. Posterior pharynx without erythema or exudates.  Neck: Supple, trachea midline. Normal range of motion. No thyromegaly present. No lymphadenopathy--cervical or supraclavicular.  Cardiovascular: Regular rate and rhythm, S1 and S2 without murmur, rubs, or gallops.  Lungs: Normal inspiratory effort, CTA bilaterally, no wheezes/rhonchi/rales  Breast: Deferred  Abdomen: Soft, non tender, and without distention. Active bowel sounds in all four quadrants. No rebound, guarding, masses or HSM.  : Deferred  Extremities: No cyanosis, clubbing, erythema, nor edema. Distal pulses intact and symmetric.   Musculoskeletal: All major joints AROM full in all directions without pain.  Neurological: Alert and oriented x 3. DTRs 2+/3 and symmetric. No cranial " nerve deficit. 5/5 myotomes. Sensation intact.   Psychiatric:  Behavior, mood, and affect are appropriate.        Assessment and Plan:     1. Wellness examination (Primary)  Patient presents today for annual preventative visit.  She is up-to-date on preventative health screenings.  Annual lab orders ordered.  Immunizations discussed.  - CBC WITH DIFFERENTIAL; Future  - Comp Metabolic Panel; Future  - Lipid Profile; Future    2. Vitamin D deficiency  - VITAMIN D,25 HYDROXY (DEFICIENCY); Future    3. Screening for thyroid disorder  - TSH WITH REFLEX TO FT4; Future      HCM:  Completed   Labs per orders  Immunizations per orders  Patient counseled about skin care, diet, supplements, prenatal vitamins, safe sex and exercise.          Follow-up: Return in about 1 year (around 6/23/2026), or if symptoms worsen or fail to improve, for Annual.

## 2025-07-23 ENCOUNTER — TELEPHONE (OUTPATIENT)
Dept: HEMATOLOGY ONCOLOGY | Facility: MEDICAL CENTER | Age: 65
End: 2025-07-23
Payer: COMMERCIAL

## 2025-07-23 NOTE — TELEPHONE ENCOUNTER
Called patient to let her know that her visit with Dr. Ocasio must be rescheduled due to Dr. Ocasio being unavailable. Let patient know to call our office if the new appointment date/time does not work for her, so we can find a better time for the patient. Left the office phone number, or asked patient to reach out via [employer].

## 2025-08-05 ENCOUNTER — HOSPITAL ENCOUNTER (OUTPATIENT)
Facility: MEDICAL CENTER | Age: 65
End: 2025-08-05
Payer: COMMERCIAL

## 2025-08-05 VITALS — BODY MASS INDEX: 20.66 KG/M2 | HEIGHT: 64 IN | WEIGHT: 121 LBS

## 2025-08-05 DIAGNOSIS — D05.12 DUCTAL CARCINOMA IN SITU (DCIS) OF LEFT BREAST: ICD-10-CM

## 2025-08-05 PROCEDURE — G0279 TOMOSYNTHESIS, MAMMO: HCPCS

## 2025-08-05 ASSESSMENT — FIBROSIS 4 INDEX: FIB4 SCORE: 1.44

## 2025-08-14 ENCOUNTER — HOSPITAL ENCOUNTER (OUTPATIENT)
Facility: MEDICAL CENTER | Age: 65
End: 2025-08-14
Attending: STUDENT IN AN ORGANIZED HEALTH CARE EDUCATION/TRAINING PROGRAM
Payer: COMMERCIAL

## 2025-08-14 VITALS
TEMPERATURE: 97.8 F | WEIGHT: 125.6 LBS | SYSTOLIC BLOOD PRESSURE: 120 MMHG | BODY MASS INDEX: 21.44 KG/M2 | DIASTOLIC BLOOD PRESSURE: 68 MMHG | HEIGHT: 64 IN | OXYGEN SATURATION: 99 % | RESPIRATION RATE: 14 BRPM | HEART RATE: 54 BPM

## 2025-08-14 DIAGNOSIS — D05.12 DUCTAL CARCINOMA IN SITU (DCIS) OF LEFT BREAST: Primary | ICD-10-CM

## 2025-08-14 PROCEDURE — 99214 OFFICE O/P EST MOD 30 MIN: CPT | Performed by: STUDENT IN AN ORGANIZED HEALTH CARE EDUCATION/TRAINING PROGRAM

## 2025-08-14 ASSESSMENT — FIBROSIS 4 INDEX: FIB4 SCORE: 1.44

## 2025-08-25 ENCOUNTER — OUTPATIENT INFUSION SERVICES (OUTPATIENT)
Dept: ONCOLOGY | Facility: MEDICAL CENTER | Age: 65
End: 2025-08-25
Attending: STUDENT IN AN ORGANIZED HEALTH CARE EDUCATION/TRAINING PROGRAM
Payer: COMMERCIAL

## 2025-08-25 VITALS
WEIGHT: 125.66 LBS | TEMPERATURE: 97.6 F | BODY MASS INDEX: 20.94 KG/M2 | HEIGHT: 65 IN | SYSTOLIC BLOOD PRESSURE: 117 MMHG | DIASTOLIC BLOOD PRESSURE: 67 MMHG | HEART RATE: 58 BPM | RESPIRATION RATE: 18 BRPM | OXYGEN SATURATION: 97 %

## 2025-08-25 DIAGNOSIS — D05.12 DUCTAL CARCINOMA IN SITU (DCIS) OF LEFT BREAST: Primary | ICD-10-CM

## 2025-08-25 LAB — CA-I BLD ISE-SCNC: 1.16 MMOL/L (ref 1.1–1.3)

## 2025-08-25 PROCEDURE — 82330 ASSAY OF CALCIUM: CPT

## 2025-08-25 PROCEDURE — 96372 THER/PROPH/DIAG INJ SC/IM: CPT

## 2025-08-25 PROCEDURE — 700111 HCHG RX REV CODE 636 W/ 250 OVERRIDE (IP): Mod: JZ | Performed by: STUDENT IN AN ORGANIZED HEALTH CARE EDUCATION/TRAINING PROGRAM

## 2025-08-25 PROCEDURE — 36415 COLL VENOUS BLD VENIPUNCTURE: CPT

## 2025-08-25 RX ORDER — DIPHENHYDRAMINE HYDROCHLORIDE 50 MG/ML
50 INJECTION, SOLUTION INTRAMUSCULAR; INTRAVENOUS PRN
OUTPATIENT
Start: 2026-02-19

## 2025-08-25 RX ORDER — EPINEPHRINE 1 MG/ML(1)
0.5 AMPUL (ML) INJECTION PRN
OUTPATIENT
Start: 2026-02-19

## 2025-08-25 RX ORDER — ALBUTEROL SULFATE 90 UG/1
2 INHALANT RESPIRATORY (INHALATION) PRN
OUTPATIENT
Start: 2026-02-19

## 2025-08-25 RX ORDER — METHYLPREDNISOLONE SODIUM SUCCINATE 125 MG/2ML
125 INJECTION, POWDER, LYOPHILIZED, FOR SOLUTION INTRAMUSCULAR; INTRAVENOUS PRN
OUTPATIENT
Start: 2026-02-19

## 2025-08-25 RX ADMIN — DENOSUMAB 60 MG: 60 INJECTION SUBCUTANEOUS at 09:14

## 2025-08-25 ASSESSMENT — FIBROSIS 4 INDEX: FIB4 SCORE: 1.44

## 2025-08-26 ENCOUNTER — APPOINTMENT (OUTPATIENT)
Dept: URBAN - METROPOLITAN AREA CLINIC 22 | Facility: CLINIC | Age: 65
Setting detail: DERMATOLOGY
End: 2025-08-26

## 2025-08-26 DIAGNOSIS — D22 MELANOCYTIC NEVI: ICD-10-CM

## 2025-08-26 DIAGNOSIS — L82.1 OTHER SEBORRHEIC KERATOSIS: ICD-10-CM

## 2025-08-26 DIAGNOSIS — L81.4 OTHER MELANIN HYPERPIGMENTATION: ICD-10-CM

## 2025-08-26 DIAGNOSIS — D18.0 HEMANGIOMA: ICD-10-CM

## 2025-08-26 DIAGNOSIS — Z71.89 OTHER SPECIFIED COUNSELING: ICD-10-CM

## 2025-08-26 PROBLEM — D48.5 NEOPLASM OF UNCERTAIN BEHAVIOR OF SKIN: Status: ACTIVE | Noted: 2025-08-26

## 2025-08-26 PROBLEM — D22.5 MELANOCYTIC NEVI OF TRUNK: Status: ACTIVE | Noted: 2025-08-26

## 2025-08-26 PROBLEM — D18.01 HEMANGIOMA OF SKIN AND SUBCUTANEOUS TISSUE: Status: ACTIVE | Noted: 2025-08-26

## 2025-08-26 PROCEDURE — ? SUNSCREEN RECOMMENDATIONS

## 2025-08-26 PROCEDURE — ? COUNSELING

## 2025-08-26 PROCEDURE — ? BIOPSY BY SHAVE METHOD

## 2025-08-26 ASSESSMENT — LOCATION ZONE DERM
LOCATION ZONE: TRUNK
LOCATION ZONE: ARM

## 2025-08-26 ASSESSMENT — LOCATION SIMPLE DESCRIPTION DERM
LOCATION SIMPLE: RIGHT UPPER BACK
LOCATION SIMPLE: LEFT FOREARM
LOCATION SIMPLE: ABDOMEN
LOCATION SIMPLE: LEFT UPPER BACK

## 2025-08-26 ASSESSMENT — LOCATION DETAILED DESCRIPTION DERM
LOCATION DETAILED: LEFT PROXIMAL DORSAL FOREARM
LOCATION DETAILED: LEFT INFERIOR UPPER BACK
LOCATION DETAILED: RIGHT INFERIOR UPPER BACK
LOCATION DETAILED: LEFT LATERAL ABDOMEN
LOCATION DETAILED: RIGHT MID-UPPER BACK

## (undated) DEVICE — SUTURE 3-0 VICRYL PLUS SH - 8X 18 INCH (12/BX)

## (undated) DEVICE — CHLORAPREP 26 ML APPLICATOR - ORANGE TINT(25/CA)

## (undated) DEVICE — SLEEVE STERILE A599T - 30/BX 2BX/CS

## (undated) DEVICE — DRAPE LARGE 3 QUARTER - (20/CA)

## (undated) DEVICE — GLOVE BIOGEL PI INDICATOR SZ 7.5 SURGICAL PF LF -(50/BX 4BX/CA)

## (undated) DEVICE — LACTATED RINGERS INJ 1000 ML - (14EA/CA 60CA/PF)

## (undated) DEVICE — SET LEADWIRE 5 LEAD BEDSIDE DISPOSABLE ECG (1SET OF 5/EA)

## (undated) DEVICE — SUTURE GENERAL

## (undated) DEVICE — GOWN WARMING STANDARD FLEX - (30/CA)

## (undated) DEVICE — GOWN SURGEONS X-LARGE - DISP. (30/CA)

## (undated) DEVICE — PLUMEPEN ULTRA 3/8 IN X 10 FT HOSE (20EA/CA)

## (undated) DEVICE — ELECTRODE DUAL RETURN W/ CORD - (50/PK)

## (undated) DEVICE — GLOVE SZ 6 BIOGEL PI MICRO - PF LF (50PR/BX 4BX/CA)

## (undated) DEVICE — KIT  I.V. START (100EA/CA)

## (undated) DEVICE — CANISTER SUCTION RIGID RED 1500CC (40EA/CA)

## (undated) DEVICE — SENSOR OXIMETER ADULT SPO2 RD SET (20EA/BX)

## (undated) DEVICE — TOWEL STOP TIMEOUT SAFETY FLAG (40EA/CA)

## (undated) DEVICE — SLEEVE VASO DVT COMPRESSION CALF MED - (10PR/CA)

## (undated) DEVICE — TUBE CONNECTING SUCTION - CLEAR PLASTIC STERILE 72 IN (50EA/CA)

## (undated) DEVICE — GLOVE BIOGEL SZ 6 PF LATEX - (50EA/BX 4BX/CA)

## (undated) DEVICE — SPONGE GAUZESTER. 2X2 4-PL - (2/PK 50PK/BX 30BX/CS)

## (undated) DEVICE — GLOVE BIOGEL INDICATOR SZ 6.5 SURGICAL PF LTX - (50PR/BX 4BX/CA)

## (undated) DEVICE — TOWELS CLOTH SURGICAL - (4/PK 20PK/CA)

## (undated) DEVICE — PAD MAGNETIC INSTRUMENT FOAM HOLDER (200/CA)

## (undated) DEVICE — SUTURE 4-0 MONOCRYL PLUS PS-2 - 27 INCH (36/BX)

## (undated) DEVICE — CANISTER SUCTION 3000ML MECHANICAL FILTER AUTO SHUTOFF MEDI-VAC NONSTERILE LF DISP (40EA/CA)

## (undated) DEVICE — DRESSING TRANSPARENT FILM TEGADERM 4 X 4.75" (50EA/BX)"

## (undated) DEVICE — SUCTION INSTRUMENT YANKAUER BULBOUS TIP W/O VENT (50EA/CA)

## (undated) DEVICE — BLADE ELECTRODE COATED EDGE (50EA/PK)

## (undated) DEVICE — CLIP APPLIER OPEN SMALL (6EA/BX)

## (undated) DEVICE — SHEET TRANSVERSE LAP - (12EA/CA)

## (undated) DEVICE — GLOVE BIOGEL PI INDICATOR SZ 6.5 SURGICAL PF LF - (50/BX 4BX/CA)

## (undated) DEVICE — SODIUM CHL IRRIGATION 0.9% 1000ML (12EA/CA)

## (undated) DEVICE — TUBING CLEARLINK DUO-VENT - C-FLO (48EA/CA)

## (undated) DEVICE — Device

## (undated) DEVICE — DRESSING TRANSPARENT FILM TEGADERM 2.375 X 2.75" (100EA/BX)"

## (undated) DEVICE — CLOSURE SKIN STRIP 1 X 5 INCH (25EA/BX 4BX/CA)

## (undated) DEVICE — MASK OXYGEN VNYL ADLT MED CONC WITH 7 FOOT TUBING - (50EA/CA)

## (undated) DEVICE — CANNULA O2 COMFORT SOFT EAR ADULT 7 FT TUBING (50/CA)